# Patient Record
Sex: MALE | Race: WHITE | NOT HISPANIC OR LATINO
[De-identification: names, ages, dates, MRNs, and addresses within clinical notes are randomized per-mention and may not be internally consistent; named-entity substitution may affect disease eponyms.]

---

## 2018-03-12 ENCOUNTER — OTHER (OUTPATIENT)
Age: 53
End: 2018-03-12

## 2018-03-12 ENCOUNTER — APPOINTMENT (OUTPATIENT)
Dept: GASTROENTEROLOGY | Facility: CLINIC | Age: 53
End: 2018-03-12

## 2018-10-01 ENCOUNTER — APPOINTMENT (OUTPATIENT)
Dept: SURGERY | Facility: CLINIC | Age: 53
End: 2018-10-01
Payer: COMMERCIAL

## 2018-10-01 VITALS
SYSTOLIC BLOOD PRESSURE: 102 MMHG | BODY MASS INDEX: 22.66 KG/M2 | DIASTOLIC BLOOD PRESSURE: 68 MMHG | WEIGHT: 136 LBS | HEIGHT: 65 IN

## 2018-10-01 DIAGNOSIS — K83.8 OTHER SPECIFIED DISEASES OF BILIARY TRACT: ICD-10-CM

## 2018-10-01 DIAGNOSIS — N50.811 RIGHT TESTICULAR PAIN: ICD-10-CM

## 2018-10-01 PROCEDURE — 99242 OFF/OP CONSLTJ NEW/EST SF 20: CPT

## 2018-10-19 ENCOUNTER — APPOINTMENT (OUTPATIENT)
Dept: SURGERY | Facility: CLINIC | Age: 53
End: 2018-10-19

## 2019-04-03 ENCOUNTER — APPOINTMENT (OUTPATIENT)
Dept: PULMONOLOGY | Facility: CLINIC | Age: 54
End: 2019-04-03

## 2019-04-17 ENCOUNTER — APPOINTMENT (OUTPATIENT)
Dept: PULMONOLOGY | Facility: CLINIC | Age: 54
End: 2019-04-17

## 2019-07-03 ENCOUNTER — APPOINTMENT (OUTPATIENT)
Dept: PULMONOLOGY | Facility: CLINIC | Age: 54
End: 2019-07-03
Payer: COMMERCIAL

## 2019-07-03 VITALS
DIASTOLIC BLOOD PRESSURE: 70 MMHG | HEIGHT: 65 IN | BODY MASS INDEX: 22.66 KG/M2 | HEART RATE: 77 BPM | RESPIRATION RATE: 14 BRPM | SYSTOLIC BLOOD PRESSURE: 102 MMHG | TEMPERATURE: 98.4 F | OXYGEN SATURATION: 92 % | WEIGHT: 136 LBS

## 2019-07-03 DIAGNOSIS — Z82.5 FAMILY HISTORY OF ASTHMA AND OTHER CHRONIC LOWER RESPIRATORY DISEASES: ICD-10-CM

## 2019-07-03 LAB — POCT - HEMOGLOBIN (HGB), QUANTITATIVE, TRANSCUTANEOUS: 14.3

## 2019-07-03 PROCEDURE — 94729 DIFFUSING CAPACITY: CPT

## 2019-07-03 PROCEDURE — 99204 OFFICE O/P NEW MOD 45 MIN: CPT | Mod: 25

## 2019-07-03 PROCEDURE — 94060 EVALUATION OF WHEEZING: CPT

## 2019-07-03 PROCEDURE — 88738 HGB QUANT TRANSCUTANEOUS: CPT

## 2019-07-03 PROCEDURE — 95012 NITRIC OXIDE EXP GAS DETER: CPT

## 2019-07-03 PROCEDURE — 94727 GAS DIL/WSHOT DETER LNG VOL: CPT

## 2019-07-03 NOTE — PROCEDURE
[FreeTextEntry1] : CXR: clear lungs, no focal consolidation or pleural effusions, cardiac silhouette appears normal.  No bony abnormality.\par \par Exhaled nitric oxide: 62, elevated\par \par PFT: moderate obstructive dysfunction without a significant bronchodilator response.  Lung volumes low with evidence of air trapping. DLCO normal.\par

## 2019-07-03 NOTE — PHYSICAL EXAM
[Well Groomed] : well groomed [Neck Appearance] : the appearance of the neck was normal [General Appearance - In No Acute Distress] : no acute distress [] : no respiratory distress [Heart Sounds] : normal S1 and S2 [Exaggerated Use Of Accessory Muscles For Inspiration] : no accessory muscle use [Respiration, Rhythm And Depth] : normal respiratory rhythm and effort [Auscultation Breath Sounds / Voice Sounds] : lungs were clear to auscultation bilaterally [Nail Clubbing] : no clubbing of the fingernails [Cyanosis, Localized] : no localized cyanosis

## 2019-07-03 NOTE — ASSESSMENT
[FreeTextEntry1] : start asmanex bid now (samples given)\par start singulair now\par start medrol pack now\par reassess symptoms in 1 week\par \par

## 2019-07-03 NOTE — HISTORY OF PRESENT ILLNESS
[FreeTextEntry1] : 53 M gets a "cold" every three months.  Usually runny nose, chest congestion, cough, chest tightness and difficulty breathing, difficulty talking, gets very dizzy from the coughing.  Went to urgent care last week given zpack, advair, promethazine/codeine cough syrup.  Still no improvement in cough.   Hot air/cold air, strong smells also trigger difficulty breathing.  States he has been wheezing a lot last few days.  No known history of asthma.  Denies fever/chills. 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

## 2019-07-10 ENCOUNTER — APPOINTMENT (OUTPATIENT)
Dept: PULMONOLOGY | Facility: CLINIC | Age: 54
End: 2019-07-10

## 2019-07-24 ENCOUNTER — APPOINTMENT (OUTPATIENT)
Dept: DERMATOLOGY | Facility: CLINIC | Age: 54
End: 2019-07-24

## 2019-07-29 ENCOUNTER — APPOINTMENT (OUTPATIENT)
Dept: ENDOCRINOLOGY | Facility: CLINIC | Age: 54
End: 2019-07-29

## 2019-08-27 ENCOUNTER — APPOINTMENT (OUTPATIENT)
Dept: DERMATOLOGY | Facility: CLINIC | Age: 54
End: 2019-08-27

## 2019-09-18 ENCOUNTER — LABORATORY RESULT (OUTPATIENT)
Age: 54
End: 2019-09-18

## 2019-09-18 ENCOUNTER — APPOINTMENT (OUTPATIENT)
Dept: ENDOCRINOLOGY | Facility: CLINIC | Age: 54
End: 2019-09-18
Payer: COMMERCIAL

## 2019-09-18 VITALS
WEIGHT: 145 LBS | HEIGHT: 65 IN | DIASTOLIC BLOOD PRESSURE: 70 MMHG | HEART RATE: 68 BPM | OXYGEN SATURATION: 96 % | TEMPERATURE: 97.9 F | BODY MASS INDEX: 24.16 KG/M2 | SYSTOLIC BLOOD PRESSURE: 105 MMHG

## 2019-09-18 DIAGNOSIS — R79.89 OTHER SPECIFIED ABNORMAL FINDINGS OF BLOOD CHEMISTRY: ICD-10-CM

## 2019-09-18 PROCEDURE — 99204 OFFICE O/P NEW MOD 45 MIN: CPT | Mod: 25

## 2019-09-18 PROCEDURE — 36415 COLL VENOUS BLD VENIPUNCTURE: CPT

## 2019-09-18 RX ORDER — METHYLPREDNISOLONE 4 MG/1
4 TABLET ORAL
Qty: 1 | Refills: 2 | Status: DISCONTINUED | COMMUNITY
Start: 2019-07-03 | End: 2019-09-18

## 2019-09-19 ENCOUNTER — APPOINTMENT (OUTPATIENT)
Dept: PULMONOLOGY | Facility: CLINIC | Age: 54
End: 2019-09-19
Payer: COMMERCIAL

## 2019-09-19 VITALS
DIASTOLIC BLOOD PRESSURE: 71 MMHG | BODY MASS INDEX: 24.16 KG/M2 | HEART RATE: 74 BPM | SYSTOLIC BLOOD PRESSURE: 103 MMHG | RESPIRATION RATE: 15 BRPM | WEIGHT: 145 LBS | HEIGHT: 65 IN | OXYGEN SATURATION: 96 %

## 2019-09-19 DIAGNOSIS — R07.9 CHEST PAIN, UNSPECIFIED: ICD-10-CM

## 2019-09-19 PROCEDURE — 94060 EVALUATION OF WHEEZING: CPT

## 2019-09-19 PROCEDURE — 71046 X-RAY EXAM CHEST 2 VIEWS: CPT

## 2019-09-19 PROCEDURE — 99213 OFFICE O/P EST LOW 20 MIN: CPT | Mod: 25

## 2019-09-19 PROCEDURE — 36415 COLL VENOUS BLD VENIPUNCTURE: CPT

## 2019-09-19 NOTE — HISTORY OF PRESENT ILLNESS
[FreeTextEntry1] : since last visit did not take asmanex\par used singulair for short period, not taking anymore\par having sticking left upper chest pain, hurts with deep breathing, similair to initial presentation but not as bad.\par no fever/chills\par

## 2019-09-19 NOTE — ASSESSMENT
[FreeTextEntry1] : cont singulair for asthma, cont prn ventolin\par \par cp/dyspnea concerning, will send for CTA to r/o PE since breathing is improved from last visit but pain worsening.

## 2019-09-19 NOTE — PROCEDURE
[FreeTextEntry1] : CXR: clear lungs, no focal consolidation or pleural effusions, cardiac silhouette appears normal.  No bony abnormality.\par \par Spirometry: moderate obstructive dysfunction without a significant bronchodilator response.  Improved from last visit.\par

## 2019-09-20 LAB
ANION GAP SERPL CALC-SCNC: 14 MMOL/L
BUN SERPL-MCNC: 10 MG/DL
CALCIUM SERPL-MCNC: 9.9 MG/DL
CHLORIDE SERPL-SCNC: 102 MMOL/L
CO2 SERPL-SCNC: 25 MMOL/L
CREAT SERPL-MCNC: 1.08 MG/DL
DEPRECATED D DIMER PPP IA-ACNC: <150 NG/ML DDU
GLUCOSE SERPL-MCNC: 116 MG/DL
POTASSIUM SERPL-SCNC: 4.6 MMOL/L
SODIUM SERPL-SCNC: 141 MMOL/L

## 2019-09-29 PROBLEM — R79.89 ABNORMAL TSH: Status: ACTIVE | Noted: 2019-09-18

## 2019-09-29 LAB
25(OH)D3 SERPL-MCNC: 30.8 NG/ML
T3FREE SERPL-MCNC: 2.8 PG/ML
T4 FREE SERPL-MCNC: 1.2 NG/DL
THYROGLOB AB SERPL-ACNC: <20 IU/ML
THYROGLOB SERPL-MCNC: 22 NG/ML
TSH SERPL-ACNC: 2.71 UIU/ML

## 2019-10-03 ENCOUNTER — FORM ENCOUNTER (OUTPATIENT)
Age: 54
End: 2019-10-03

## 2019-10-03 ENCOUNTER — APPOINTMENT (OUTPATIENT)
Dept: PULMONOLOGY | Facility: CLINIC | Age: 54
End: 2019-10-03

## 2019-10-04 ENCOUNTER — APPOINTMENT (OUTPATIENT)
Dept: CT IMAGING | Facility: CLINIC | Age: 54
End: 2019-10-04
Payer: COMMERCIAL

## 2019-10-04 ENCOUNTER — OUTPATIENT (OUTPATIENT)
Dept: OUTPATIENT SERVICES | Facility: HOSPITAL | Age: 54
LOS: 1 days | End: 2019-10-04
Payer: COMMERCIAL

## 2019-10-04 DIAGNOSIS — R07.9 CHEST PAIN, UNSPECIFIED: ICD-10-CM

## 2019-10-04 PROCEDURE — 71275 CT ANGIOGRAPHY CHEST: CPT

## 2019-10-04 PROCEDURE — 71275 CT ANGIOGRAPHY CHEST: CPT | Mod: 26

## 2019-10-10 ENCOUNTER — APPOINTMENT (OUTPATIENT)
Dept: PULMONOLOGY | Facility: CLINIC | Age: 54
End: 2019-10-10
Payer: COMMERCIAL

## 2019-10-10 VITALS — HEART RATE: 80 BPM | DIASTOLIC BLOOD PRESSURE: 79 MMHG | OXYGEN SATURATION: 97 % | SYSTOLIC BLOOD PRESSURE: 131 MMHG

## 2019-10-10 PROCEDURE — 90686 IIV4 VACC NO PRSV 0.5 ML IM: CPT

## 2019-10-10 PROCEDURE — G0008: CPT

## 2019-10-10 PROCEDURE — 99213 OFFICE O/P EST LOW 20 MIN: CPT | Mod: 25

## 2019-10-10 NOTE — PHYSICAL EXAM
[Well Groomed] : well groomed [General Appearance - In No Acute Distress] : no acute distress [Neck Appearance] : the appearance of the neck was normal [Heart Sounds] : normal S1 and S2 [Exaggerated Use Of Accessory Muscles For Inspiration] : no accessory muscle use [] : no respiratory distress [Respiration, Rhythm And Depth] : normal respiratory rhythm and effort [Auscultation Breath Sounds / Voice Sounds] : lungs were clear to auscultation bilaterally [Nail Clubbing] : no clubbing of the fingernails [Cyanosis, Localized] : no localized cyanosis

## 2019-10-10 NOTE — PROCEDURE
[FreeTextEntry1] : EXAM: CT ANGIO CHEST (W)AW IC \par \par PROCEDURE DATE: 10/04/2019 \par \par INTERPRETATION: CLINICAL INFORMATION: 54-year-old man with shortness of \par breath for 6 months. \par \par COMPARISON: CT scan 06/10/2015 PROCEDURE: \par CT Angiography of the Chest. \par 90 ml of Omnipaque 350 was injected intravenously. 10 ml were discarded. \par Sagittal and coronal reformats were performed as well as 3D (MIP) \par reconstructions. \par \par FINDINGS: \par \par LUNGS AND AIRWAYS: Patent central airways. Calcified granuloma right lower \par lobe. \par \par PLEURA: No pleural effusion. \par \par MEDIASTINUM AND RAMON: No lymphadenopathy. \par \par VESSELS: No pulmonary embolus. \par \par HEART: Heart size is normal. No pericardial effusion. \par \par CHEST WALL AND LOWER NECK: Within normal limits. \par \par VISUALIZED UPPER ABDOMEN: Subcentimeter hypodensity liver dome. \par \par BONES: Within normal limits. \par \par IMPRESSION: \par \par No pulmonary embolus. \par \par Prior exams reviewed:\par CXR: clear lungs, no focal consolidation or pleural effusions, cardiac silhouette appears normal.  No bony abnormality.\par \par Spirometry: moderate obstructive dysfunction without a significant bronchodilator response.  Improved from last visit.\par

## 2019-10-10 NOTE — HISTORY OF PRESENT ILLNESS
[FreeTextEntry1] : still gets sticking left sided cp.  Had cta which was negative.\par reports having a cardiac stress test that was normal.\par when he gets sick he has a lot of coughing/difficulty breathing/wheeze.\par currently on singulair daily and self started a medrol pack bc got sick last week. \par breathing is ok today

## 2019-10-10 NOTE — ASSESSMENT
[FreeTextEntry1] : cont singulair daily, add breo daily (pts brother gave him a breo sample), cont prn ventolin\par \par strongly encouraged to stay on controller meds so that when he is sick he doesn't have a bad exacerbation.\par \par will rx medrol pack to have at home just in case.\par \par flu shot today\par \par fu 1 month

## 2019-10-16 ENCOUNTER — OTHER (OUTPATIENT)
Age: 54
End: 2019-10-16

## 2019-10-17 NOTE — PHYSICAL EXAM
[Alert] : alert [No Acute Distress] : no acute distress [Well Nourished] : well nourished [Normal Sclera/Conjunctiva] : normal sclera/conjunctiva [Well Developed] : well developed [EOMI] : extra ocular movement intact [No Proptosis] : no proptosis [Normal Oropharynx] : the oropharynx was normal [Thyroid Not Enlarged] : the thyroid was not enlarged [No Thyroid Nodules] : there were no palpable thyroid nodules [No Respiratory Distress] : no respiratory distress [No Accessory Muscle Use] : no accessory muscle use [Clear to Auscultation] : lungs were clear to auscultation bilaterally [Normal Rate] : heart rate was normal  [Normal S1, S2] : normal S1 and S2 [Regular Rhythm] : with a regular rhythm [No Edema] : there was no peripheral edema [Pedal Pulses Normal] : the pedal pulses are present [Normal Bowel Sounds] : normal bowel sounds [Not Tender] : non-tender [Soft] : abdomen soft [Not Distended] : not distended [Post Cervical Nodes] : posterior cervical nodes [Anterior Cervical Nodes] : anterior cervical nodes [Axillary Nodes] : axillary nodes [Normal] : normal and non tender [No Spinal Tenderness] : no spinal tenderness [No Stigmata of Cushings Syndrome] : no stigmata of cushings syndrome [Spine Straight] : spine straight [Normal Gait] : normal gait [Normal Strength/Tone] : muscle strength and tone were normal [No Rash] : no rash [Normal Reflexes] : deep tendon reflexes were 2+ and symmetric [No Tremors] : no tremors [Oriented x3] : oriented to person, place, and time [Acanthosis Nigricans] : no acanthosis nigricans

## 2019-10-17 NOTE — HISTORY OF PRESENT ILLNESS
[FreeTextEntry1] : Mr. RACHEL KIMBROUGH   is a 54 year  year old  male who presents today via the kind courtesy of PCP  Dr Fabricio Cole with regard to recent  history of thyroid nodule. Patient sister has thyroid disorder.  Patient had ultrasound  with a  1.2 cm right interpolar solid nodule noted with  percutaneous biopsy  recommended. too, tsh in January was 4.63 Patient denies difficulty swallowing or anterior neck pain. Patient is a social smoker. Reports takes PRN ? asthma medication/inhaler,possible albuterol.\par Additional medical history includes : Asthma Some foot swelling -is to see cardiologist\par Has follow up tomorrow with pulmonologist Dr Evette Ozuna \par No signif fatigue-he does stay awake nights and sleep during day.

## 2019-10-22 ENCOUNTER — APPOINTMENT (OUTPATIENT)
Dept: ENDOCRINOLOGY | Facility: CLINIC | Age: 54
End: 2019-10-22
Payer: COMMERCIAL

## 2019-10-22 VITALS
DIASTOLIC BLOOD PRESSURE: 80 MMHG | BODY MASS INDEX: 24.16 KG/M2 | TEMPERATURE: 97.6 F | SYSTOLIC BLOOD PRESSURE: 115 MMHG | WEIGHT: 145 LBS | HEART RATE: 75 BPM | HEIGHT: 65 IN | OXYGEN SATURATION: 97 %

## 2019-10-22 DIAGNOSIS — R05 COUGH: ICD-10-CM

## 2019-10-22 PROCEDURE — 99214 OFFICE O/P EST MOD 30 MIN: CPT

## 2019-11-07 ENCOUNTER — APPOINTMENT (OUTPATIENT)
Dept: SURGERY | Facility: CLINIC | Age: 54
End: 2019-11-07
Payer: COMMERCIAL

## 2019-11-07 VITALS
SYSTOLIC BLOOD PRESSURE: 127 MMHG | BODY MASS INDEX: 24.32 KG/M2 | WEIGHT: 146 LBS | HEART RATE: 64 BPM | DIASTOLIC BLOOD PRESSURE: 77 MMHG | HEIGHT: 65 IN

## 2019-11-07 PROCEDURE — 99204 OFFICE O/P NEW MOD 45 MIN: CPT | Mod: 25

## 2019-11-07 PROCEDURE — 31575 DIAGNOSTIC LARYNGOSCOPY: CPT

## 2019-11-17 PROBLEM — R05 COUGH: Status: ACTIVE | Noted: 2019-07-03

## 2019-11-17 NOTE — HISTORY OF PRESENT ILLNESS
[FreeTextEntry1] : Mr. Jim returns for endocrine follow up.  He did undergo US guided fna of a 1.1 x 1.0 cm rt interpolar nodule with lobulated/irregular margins.  The results of the fna,as discussed in detail today with Mr. Jim, were c/w Papillary thyroid carcinoma. He too has a hx of asthma and is on Singulari per pulmonologist.\par \par I did discuss the implications of this diagnosis in detail with Mr. Jim.\par Denies anterior neck discomfort, difficulty swallowing, or any change in the appearance of the neck region.\par \par

## 2019-11-17 NOTE — PHYSICAL EXAM
[Alert] : alert [Well Nourished] : well nourished [No Acute Distress] : no acute distress [Well Developed] : well developed [No Proptosis] : no proptosis [EOMI] : extra ocular movement intact [Normal Sclera/Conjunctiva] : normal sclera/conjunctiva [Normal Oropharynx] : the oropharynx was normal [Thyroid Not Enlarged] : the thyroid was not enlarged [No Respiratory Distress] : no respiratory distress [No Thyroid Nodules] : there were no palpable thyroid nodules [Normal Rate] : heart rate was normal  [No Accessory Muscle Use] : no accessory muscle use [Clear to Auscultation] : lungs were clear to auscultation bilaterally [Regular Rhythm] : with a regular rhythm [Normal S1, S2] : normal S1 and S2 [No Edema] : there was no peripheral edema [Pedal Pulses Normal] : the pedal pulses are present [Not Tender] : non-tender [Normal Bowel Sounds] : normal bowel sounds [Soft] : abdomen soft [Post Cervical Nodes] : posterior cervical nodes [Anterior Cervical Nodes] : anterior cervical nodes [Not Distended] : not distended [Axillary Nodes] : axillary nodes [Normal] : normal and non tender [Spine Straight] : spine straight [No Spinal Tenderness] : no spinal tenderness [No Stigmata of Cushings Syndrome] : no stigmata of cushings syndrome [Normal Gait] : normal gait [Normal Strength/Tone] : muscle strength and tone were normal [No Rash] : no rash [Acanthosis Nigricans] : no acanthosis nigricans [Oriented x3] : oriented to person, place, and time [Normal Reflexes] : deep tendon reflexes were 2+ and symmetric [No Tremors] : no tremors

## 2020-01-08 ENCOUNTER — RESULT REVIEW (OUTPATIENT)
Age: 55
End: 2020-01-08

## 2020-01-09 NOTE — REASON FOR VISIT
[Initial Consultation] : an initial consultation for [FreeTextEntry2] : Papillary Thyroid malignancy

## 2020-01-09 NOTE — PHYSICAL EXAM
[Nasal Endoscopy Performed] : nasal endoscopy was performed, see procedure section for findings [Laryngoscopy Performed] : laryngoscopy was performed, see procedure section for findings [Midline] : located in midline position [Normal] : cranial nerves 2-12 intact [de-identified] : < 1 cm right thyroid firm nodule, well circumscribed and mobile [de-identified] : fiberoptic laryngoscopy shows normal vocal cord mobility bilaterally with no lesions noted, arytenoid erythema

## 2020-01-09 NOTE — ASSESSMENT
[FreeTextEntry1] : lengthy discussion regarding options for management including active surveillance within a formal study vs thyroid lobectomy with paratracheal node dissection, with or without frozen section vs total thyroidectomy with paratracheal node dissection.  risks, benefits and alternatives discussed at length.  I have discussed with the patient the anatomy of the area, the pathophysiology of the disease process and the rationale for surgery.  The attendant risks, possible complications and expected postoperative course have been discussed in detail.  I have given the patient the opportunity to ask questions, and all questions have been answered to the patient's satisfaction, and he wishes to proceed with lobectomy, possible total thyroidectomy.  instructed to avoid irritants and consult with GI for suspected GERD.

## 2020-01-09 NOTE — CONSULT LETTER
[Dear  ___] : Dear  [unfilled], [Consult Letter:] : I had the pleasure of evaluating your patient, [unfilled]. [Please see my note below.] : Please see my note below. [Consult Closing:] : Thank you very much for allowing me to participate in the care of this patient.  If you have any questions, please do not hesitate to contact me. [Sincerely,] : Sincerely, [DrScot  ___] : Dr. PECK [FreeTextEntry3] : Louis Cyr MD, FACS\par System Director, Endocrine Surgery\par [facility]\par  [FreeTextEntry2] : Dr. Jersey Baron, Dr. Fabricio Cole

## 2020-01-09 NOTE — HISTORY OF PRESENT ILLNESS
[de-identified] : Pt c/o Thyroid nodule.   denies dysphagia, hoarseness, SOB or RT exposure\par TFTs normal\par Sonogram:   Right 1.2 cm  nodule. \par FNA  ([facility])positive for papillary Thyroid malignancy

## 2020-01-13 ENCOUNTER — OUTPATIENT (OUTPATIENT)
Dept: OUTPATIENT SERVICES | Facility: HOSPITAL | Age: 55
LOS: 1 days | End: 2020-01-13
Payer: COMMERCIAL

## 2020-01-13 VITALS
HEIGHT: 64 IN | WEIGHT: 143.08 LBS | OXYGEN SATURATION: 98 % | SYSTOLIC BLOOD PRESSURE: 120 MMHG | TEMPERATURE: 98 F | RESPIRATION RATE: 18 BRPM | HEART RATE: 71 BPM | DIASTOLIC BLOOD PRESSURE: 68 MMHG

## 2020-01-13 DIAGNOSIS — C73 MALIGNANT NEOPLASM OF THYROID GLAND: ICD-10-CM

## 2020-01-13 DIAGNOSIS — Z01.818 ENCOUNTER FOR OTHER PREPROCEDURAL EXAMINATION: ICD-10-CM

## 2020-01-13 DIAGNOSIS — Z90.89 ACQUIRED ABSENCE OF OTHER ORGANS: Chronic | ICD-10-CM

## 2020-01-13 DIAGNOSIS — Z98.890 OTHER SPECIFIED POSTPROCEDURAL STATES: Chronic | ICD-10-CM

## 2020-01-13 LAB
ANION GAP SERPL CALC-SCNC: 14 MMO/L — SIGNIFICANT CHANGE UP (ref 7–14)
BUN SERPL-MCNC: 14 MG/DL — SIGNIFICANT CHANGE UP (ref 7–23)
CALCIUM SERPL-MCNC: 10.1 MG/DL — SIGNIFICANT CHANGE UP (ref 8.4–10.5)
CHLORIDE SERPL-SCNC: 99 MMOL/L — SIGNIFICANT CHANGE UP (ref 98–107)
CO2 SERPL-SCNC: 27 MMOL/L — SIGNIFICANT CHANGE UP (ref 22–31)
CREAT SERPL-MCNC: 1.09 MG/DL — SIGNIFICANT CHANGE UP (ref 0.5–1.3)
GLUCOSE SERPL-MCNC: 81 MG/DL — SIGNIFICANT CHANGE UP (ref 70–99)
HBA1C BLD-MCNC: 5.8 % — HIGH (ref 4–5.6)
HCT VFR BLD CALC: 47.2 % — SIGNIFICANT CHANGE UP (ref 39–50)
HGB BLD-MCNC: 15.1 G/DL — SIGNIFICANT CHANGE UP (ref 13–17)
MCHC RBC-ENTMCNC: 30 PG — SIGNIFICANT CHANGE UP (ref 27–34)
MCHC RBC-ENTMCNC: 32 % — SIGNIFICANT CHANGE UP (ref 32–36)
MCV RBC AUTO: 93.8 FL — SIGNIFICANT CHANGE UP (ref 80–100)
NRBC # FLD: 0 K/UL — SIGNIFICANT CHANGE UP (ref 0–0)
PLATELET # BLD AUTO: 208 K/UL — SIGNIFICANT CHANGE UP (ref 150–400)
PMV BLD: 12.8 FL — SIGNIFICANT CHANGE UP (ref 7–13)
POTASSIUM SERPL-MCNC: 4.4 MMOL/L — SIGNIFICANT CHANGE UP (ref 3.5–5.3)
POTASSIUM SERPL-SCNC: 4.4 MMOL/L — SIGNIFICANT CHANGE UP (ref 3.5–5.3)
RBC # BLD: 5.03 M/UL — SIGNIFICANT CHANGE UP (ref 4.2–5.8)
RBC # FLD: 12.6 % — SIGNIFICANT CHANGE UP (ref 10.3–14.5)
SODIUM SERPL-SCNC: 140 MMOL/L — SIGNIFICANT CHANGE UP (ref 135–145)
WBC # BLD: 7.79 K/UL — SIGNIFICANT CHANGE UP (ref 3.8–10.5)
WBC # FLD AUTO: 7.79 K/UL — SIGNIFICANT CHANGE UP (ref 3.8–10.5)

## 2020-01-13 PROCEDURE — 93010 ELECTROCARDIOGRAM REPORT: CPT

## 2020-01-13 NOTE — H&P PST ADULT - MS EXT PE MLT D E PC
normal/no cyanosis/no clubbing/Left pinky deformity, left antecubital fossa skin graft scar well healed/no pedal edema

## 2020-01-13 NOTE — H&P PST ADULT - NSICDXPASTMEDICALHX_GEN_ALL_CORE_FT
PAST MEDICAL HISTORY:  Cervical herniated disc PAST MEDICAL HISTORY:  Asthma     Cervical herniated disc     Left inguinal hernia     Malignant neoplasm of thyroid gland

## 2020-01-13 NOTE — H&P PST ADULT - RS GEN PE MLT RESP DETAILS PC
airway patent/respirations non-labored/clear to auscultation bilaterally/good air movement/no intercostal retractions/breath sounds equal/no rales/no subcutaneous emphysema/no wheezes/no chest wall tenderness/no rhonchi

## 2020-01-13 NOTE — H&P PST ADULT - NEGATIVE CARDIOVASCULAR SYMPTOMS
no chest pain/no claudication/no palpitations/no dyspnea on exertion/no orthopnea/no paroxysmal nocturnal dyspnea

## 2020-01-13 NOTE — H&P PST ADULT - NEGATIVE GENERAL SYMPTOMS
no sweating/no fatigue/no weight gain/no polyphagia/no polydipsia/no chills/no fever/no malaise no chills/no weight loss/no weight gain/no polyphagia/no polydipsia/no fatigue/no fever/no sweating/no malaise

## 2020-01-13 NOTE — H&P PST ADULT - HISTORY OF PRESENT ILLNESS
55 y/o male with PMH: Asthma  abd. blood test 2 months ago, S/P US of thyroid / Biopsy of right thyroid. 55 y/o male with PMH: Asthma presents to PST for pre op evaluation stated that "abd. blood test 2 months ago". S/P US of thyroid / Biopsy of right thyroid. Now scheduled for right thyroidectomy, paratracheal node dissection , possible total thyroidectomy on 01/27/2020

## 2020-01-13 NOTE — H&P PST ADULT - MUSCULOSKELETAL
details… No joint pain, swelling or deformity; no limitation of movement detailed exam left pinky stffness/no joint swelling/no joint erythema/no calf tenderness no joint swelling/no joint erythema/left pinky stiffness/no calf tenderness

## 2020-01-13 NOTE — H&P PST ADULT - NSICDXPROBLEM_GEN_ALL_CORE_FT
PROBLEM DIAGNOSES  Problem: Malignant neoplasm of thyroid gland  Assessment and Plan: Scheduled for right thyroid lobectomy, paratracheal node dissection, possible total thyroidectomy on 01/27/2020. Pre op instructions, famotidine, chlorhexidine gluconate soap given and explained. Pt verbalized understanding.

## 2020-01-13 NOTE — H&P PST ADULT - NEGATIVE ENMT SYMPTOMS
no vertigo/no sinus symptoms/no gum bleeding/no tinnitus/no nose bleeds/no throat pain/no dysphagia/no abnormal taste sensation/no dry mouth no tinnitus/no vertigo/no sinus symptoms/no gum bleeding/no dysphagia/no ear pain/no throat pain/no nose bleeds/no abnormal taste sensation/no dry mouth no tinnitus/no vertigo/no sinus symptoms/no nasal congestion/no gum bleeding/no ear pain/no abnormal taste sensation/no dry mouth/no throat pain/no dysphagia/no nose bleeds

## 2020-01-13 NOTE — H&P PST ADULT - NEGATIVE BREAST SYMPTOMS
no breast tenderness R/no nipple discharge L/no nipple discharge R/no breast tenderness L/no breast lump R/no breast lump L

## 2020-01-13 NOTE — H&P PST ADULT - NEGATIVE GENERAL GENITOURINARY SYMPTOMS
no hematuria/no gas in urine/no flank pain R/no flank pain L/normal urinary frequency/no urine discoloration/no renal colic

## 2020-01-13 NOTE — H&P PST ADULT - NEGATIVE GASTROINTESTINAL SYMPTOMS
no vomiting/no diarrhea/no constipation/no abdominal pain/no melena/no jaundice/no hiccoughs/no nausea/no change in bowel habits/no steatorrhea

## 2020-01-15 ENCOUNTER — FORM ENCOUNTER (OUTPATIENT)
Age: 55
End: 2020-01-15

## 2020-01-16 ENCOUNTER — APPOINTMENT (OUTPATIENT)
Dept: ULTRASOUND IMAGING | Facility: CLINIC | Age: 55
End: 2020-01-16
Payer: COMMERCIAL

## 2020-01-16 ENCOUNTER — OUTPATIENT (OUTPATIENT)
Dept: OUTPATIENT SERVICES | Facility: HOSPITAL | Age: 55
LOS: 1 days | End: 2020-01-16
Payer: COMMERCIAL

## 2020-01-16 DIAGNOSIS — C73 MALIGNANT NEOPLASM OF THYROID GLAND: ICD-10-CM

## 2020-01-16 DIAGNOSIS — Z90.89 ACQUIRED ABSENCE OF OTHER ORGANS: Chronic | ICD-10-CM

## 2020-01-16 DIAGNOSIS — Z98.890 OTHER SPECIFIED POSTPROCEDURAL STATES: Chronic | ICD-10-CM

## 2020-01-16 PROBLEM — K40.90 UNILATERAL INGUINAL HERNIA, WITHOUT OBSTRUCTION OR GANGRENE, NOT SPECIFIED AS RECURRENT: Chronic | Status: ACTIVE | Noted: 2020-01-13

## 2020-01-16 PROBLEM — M50.20 OTHER CERVICAL DISC DISPLACEMENT, UNSPECIFIED CERVICAL REGION: Chronic | Status: ACTIVE | Noted: 2020-01-13

## 2020-01-16 PROBLEM — J45.909 UNSPECIFIED ASTHMA, UNCOMPLICATED: Chronic | Status: ACTIVE | Noted: 2020-01-13

## 2020-01-16 PROCEDURE — 76536 US EXAM OF HEAD AND NECK: CPT

## 2020-01-16 PROCEDURE — 76536 US EXAM OF HEAD AND NECK: CPT | Mod: 26

## 2020-01-21 ENCOUNTER — APPOINTMENT (OUTPATIENT)
Dept: PULMONOLOGY | Facility: CLINIC | Age: 55
End: 2020-01-21

## 2020-01-22 ENCOUNTER — APPOINTMENT (OUTPATIENT)
Dept: PULMONOLOGY | Facility: CLINIC | Age: 55
End: 2020-01-22
Payer: COMMERCIAL

## 2020-01-22 VITALS — DIASTOLIC BLOOD PRESSURE: 73 MMHG | SYSTOLIC BLOOD PRESSURE: 111 MMHG | HEART RATE: 70 BPM | OXYGEN SATURATION: 97 %

## 2020-01-22 LAB — POCT - HEMOGLOBIN (HGB), QUANTITATIVE, TRANSCUTANEOUS: 14.5

## 2020-01-22 PROCEDURE — 94727 GAS DIL/WSHOT DETER LNG VOL: CPT

## 2020-01-22 PROCEDURE — 88738 HGB QUANT TRANSCUTANEOUS: CPT

## 2020-01-22 PROCEDURE — 99214 OFFICE O/P EST MOD 30 MIN: CPT | Mod: 25

## 2020-01-22 PROCEDURE — 94060 EVALUATION OF WHEEZING: CPT

## 2020-01-22 PROCEDURE — 94729 DIFFUSING CAPACITY: CPT

## 2020-01-22 RX ORDER — ALBUTEROL SULFATE 90 UG/1
108 (90 BASE) AEROSOL, METERED RESPIRATORY (INHALATION) EVERY 4 HOURS
Qty: 1 | Refills: 2 | Status: ACTIVE | COMMUNITY
Start: 2020-01-22 | End: 1900-01-01

## 2020-01-22 NOTE — ASSESSMENT
[FreeTextEntry1] : breo daily, singulair daily\par prn pro air\par medrol pack to have just in case\par check RVP

## 2020-01-22 NOTE — PROCEDURE
[FreeTextEntry1] : PFT: Normal spirometry without a significant bronchodilator response.  Lung volumes normal. DLCO normal.  Best it has been.\par

## 2020-01-23 LAB — RAPID RVP RESULT: NOT DETECTED

## 2020-01-27 ENCOUNTER — OTHER (OUTPATIENT)
Age: 55
End: 2020-01-27

## 2020-01-28 ENCOUNTER — APPOINTMENT (OUTPATIENT)
Dept: SURGERY | Facility: CLINIC | Age: 55
End: 2020-01-28

## 2020-02-11 ENCOUNTER — APPOINTMENT (OUTPATIENT)
Dept: PULMONOLOGY | Facility: CLINIC | Age: 55
End: 2020-02-11

## 2020-03-30 ENCOUNTER — APPOINTMENT (OUTPATIENT)
Dept: SURGERY | Facility: HOSPITAL | Age: 55
End: 2020-03-30

## 2020-04-27 RX ORDER — MONTELUKAST 10 MG/1
10 TABLET, FILM COATED ORAL DAILY
Qty: 90 | Refills: 0 | Status: ACTIVE | COMMUNITY
Start: 2019-07-03 | End: 1900-01-01

## 2020-04-30 ENCOUNTER — APPOINTMENT (OUTPATIENT)
Dept: SURGERY | Facility: CLINIC | Age: 55
End: 2020-04-30

## 2020-06-11 ENCOUNTER — APPOINTMENT (OUTPATIENT)
Dept: SURGERY | Facility: CLINIC | Age: 55
End: 2020-06-11

## 2020-06-17 ENCOUNTER — APPOINTMENT (OUTPATIENT)
Dept: SURGERY | Facility: CLINIC | Age: 55
End: 2020-06-17
Payer: COMMERCIAL

## 2020-06-17 PROCEDURE — 99214 OFFICE O/P EST MOD 30 MIN: CPT

## 2020-06-17 NOTE — PHYSICAL EXAM
[de-identified] : < 1 cm right thyroid firm nodule, well circumscribed and mobile [Laryngoscopy Performed] : laryngoscopy was performed, see procedure section for findings [Midline] : located in midline position [Normal] : no neck adenopathy

## 2020-06-17 NOTE — HISTORY OF PRESENT ILLNESS
[de-identified] : prior evaluation of thyroid cancer.  denies dysphagia, hoarseness or new lesions.  had been scheduled for surgery which was cancelled first time due to infection, second time due to Covid-19.  recent sonogram shows minimal enlargement of cancer, benign appearing nodes. on Singulair which he takes intermittently

## 2020-06-26 ENCOUNTER — APPOINTMENT (OUTPATIENT)
Dept: DISASTER EMERGENCY | Facility: CLINIC | Age: 55
End: 2020-06-26

## 2020-06-26 DIAGNOSIS — Z01.818 ENCOUNTER FOR OTHER PREPROCEDURAL EXAMINATION: ICD-10-CM

## 2020-06-29 ENCOUNTER — APPOINTMENT (OUTPATIENT)
Dept: SURGERY | Facility: HOSPITAL | Age: 55
End: 2020-06-29

## 2020-07-03 ENCOUNTER — TRANSCRIPTION ENCOUNTER (OUTPATIENT)
Age: 55
End: 2020-07-03

## 2020-07-08 ENCOUNTER — OUTPATIENT (OUTPATIENT)
Dept: OUTPATIENT SERVICES | Facility: HOSPITAL | Age: 55
LOS: 1 days | End: 2020-07-08

## 2020-07-08 VITALS
WEIGHT: 147.05 LBS | HEIGHT: 65 IN | DIASTOLIC BLOOD PRESSURE: 78 MMHG | HEART RATE: 67 BPM | RESPIRATION RATE: 18 BRPM | OXYGEN SATURATION: 97 % | SYSTOLIC BLOOD PRESSURE: 118 MMHG | TEMPERATURE: 99 F

## 2020-07-08 DIAGNOSIS — C73 MALIGNANT NEOPLASM OF THYROID GLAND: ICD-10-CM

## 2020-07-08 DIAGNOSIS — Z98.890 OTHER SPECIFIED POSTPROCEDURAL STATES: Chronic | ICD-10-CM

## 2020-07-08 DIAGNOSIS — Z90.89 ACQUIRED ABSENCE OF OTHER ORGANS: Chronic | ICD-10-CM

## 2020-07-08 LAB
ANION GAP SERPL CALC-SCNC: 12 MMO/L — SIGNIFICANT CHANGE UP (ref 7–14)
BUN SERPL-MCNC: 9 MG/DL — SIGNIFICANT CHANGE UP (ref 7–23)
CALCIUM SERPL-MCNC: 10.2 MG/DL — SIGNIFICANT CHANGE UP (ref 8.4–10.5)
CHLORIDE SERPL-SCNC: 103 MMOL/L — SIGNIFICANT CHANGE UP (ref 98–107)
CO2 SERPL-SCNC: 26 MMOL/L — SIGNIFICANT CHANGE UP (ref 22–31)
CREAT SERPL-MCNC: 1.08 MG/DL — SIGNIFICANT CHANGE UP (ref 0.5–1.3)
GLUCOSE SERPL-MCNC: 91 MG/DL — SIGNIFICANT CHANGE UP (ref 70–99)
HBA1C BLD-MCNC: 5.8 % — HIGH (ref 4–5.6)
HCT VFR BLD CALC: 43.4 % — SIGNIFICANT CHANGE UP (ref 39–50)
HGB BLD-MCNC: 14.1 G/DL — SIGNIFICANT CHANGE UP (ref 13–17)
MCHC RBC-ENTMCNC: 29.4 PG — SIGNIFICANT CHANGE UP (ref 27–34)
MCHC RBC-ENTMCNC: 32.5 % — SIGNIFICANT CHANGE UP (ref 32–36)
MCV RBC AUTO: 90.4 FL — SIGNIFICANT CHANGE UP (ref 80–100)
NRBC # FLD: 0 K/UL — SIGNIFICANT CHANGE UP (ref 0–0)
PLATELET # BLD AUTO: 238 K/UL — SIGNIFICANT CHANGE UP (ref 150–400)
PMV BLD: 12.5 FL — SIGNIFICANT CHANGE UP (ref 7–13)
POTASSIUM SERPL-MCNC: 4.7 MMOL/L — SIGNIFICANT CHANGE UP (ref 3.5–5.3)
POTASSIUM SERPL-SCNC: 4.7 MMOL/L — SIGNIFICANT CHANGE UP (ref 3.5–5.3)
RBC # BLD: 4.8 M/UL — SIGNIFICANT CHANGE UP (ref 4.2–5.8)
RBC # FLD: 12.9 % — SIGNIFICANT CHANGE UP (ref 10.3–14.5)
SODIUM SERPL-SCNC: 141 MMOL/L — SIGNIFICANT CHANGE UP (ref 135–145)
WBC # BLD: 7.82 K/UL — SIGNIFICANT CHANGE UP (ref 3.8–10.5)
WBC # FLD AUTO: 7.82 K/UL — SIGNIFICANT CHANGE UP (ref 3.8–10.5)

## 2020-07-08 RX ORDER — FLUTICASONE PROPIONATE AND SALMETEROL 50; 250 UG/1; UG/1
0 POWDER ORAL; RESPIRATORY (INHALATION)
Qty: 0 | Refills: 0 | DISCHARGE

## 2020-07-08 RX ORDER — CIPROFLOXACIN LACTATE 400MG/40ML
1 VIAL (ML) INTRAVENOUS
Qty: 0 | Refills: 0 | DISCHARGE

## 2020-07-08 NOTE — H&P PST ADULT - NSICDXPASTMEDICALHX_GEN_ALL_CORE_FT
PAST MEDICAL HISTORY:  Asthma     Cervical herniated disc     Left inguinal hernia     Malignant neoplasm of thyroid gland PAST MEDICAL HISTORY:  Asthma     Cervical herniated disc     Left inguinal hernia     Malignant neoplasm of thyroid gland     Prediabetes "borderline", per pt

## 2020-07-08 NOTE — H&P PST ADULT - NSICDXPROBLEM_GEN_ALL_CORE_FT
PROBLEM DIAGNOSES  Problem: Malignant neoplasm of thyroid gland  Assessment and Plan: Right thyroidectomy, paratracheal node dissection , possible total thyroidectomy.   CBC BMP Hgba1C   EKG done by Cardiologist, requested on chart  Pt reports he was seen by Dr. Lucas for pre-op eval.    Preop instructions and antibacterial soap given and explained (verbal and written), with teach back.   Pt reminded to hav covid test within 72 hours of surgery

## 2020-07-08 NOTE — H&P PST ADULT - ASSESSMENT
53 y/o male with hx of thyroid nodule  S/P US of thyroid / Biopsy of right thyroid. Now scheduled for right thyroidectomy, paratracheal node dissection, possible total thyroidectomy.

## 2020-07-08 NOTE — H&P PST ADULT - HISTORY OF PRESENT ILLNESS
53 y/o male with hx of thyroid nodule  S/P US of thyroid / Biopsy of right thyroid. Now scheduled for right thyroidectomy, paratracheal node dissection , possible total thyroidectomy.

## 2020-07-08 NOTE — H&P PST ADULT - LAST CARDIAC ANGIOGRAM/IMAGING
CT angiogram ([facility] in [place]) [phone]  normal per pt CT angiogram (done at [facility] in [place]) [phone]  normal per pt

## 2020-07-13 ENCOUNTER — APPOINTMENT (OUTPATIENT)
Dept: DISASTER EMERGENCY | Facility: CLINIC | Age: 55
End: 2020-07-13

## 2020-07-13 DIAGNOSIS — Z01.818 ENCOUNTER FOR OTHER PREPROCEDURAL EXAMINATION: ICD-10-CM

## 2020-07-14 ENCOUNTER — TRANSCRIPTION ENCOUNTER (OUTPATIENT)
Age: 55
End: 2020-07-14

## 2020-07-14 LAB — SARS-COV-2 N GENE NPH QL NAA+PROBE: NOT DETECTED

## 2020-07-14 NOTE — ASU PATIENT PROFILE, ADULT - PMH
Asthma    Cervical herniated disc    Left inguinal hernia    Malignant neoplasm of thyroid gland    Prediabetes  "borderline", per pt

## 2020-07-15 ENCOUNTER — APPOINTMENT (OUTPATIENT)
Dept: SURGERY | Facility: HOSPITAL | Age: 55
End: 2020-07-15

## 2020-07-15 ENCOUNTER — RESULT REVIEW (OUTPATIENT)
Age: 55
End: 2020-07-15

## 2020-07-15 ENCOUNTER — OUTPATIENT (OUTPATIENT)
Dept: OUTPATIENT SERVICES | Facility: HOSPITAL | Age: 55
LOS: 1 days | Discharge: HOME | End: 2020-07-15
Payer: COMMERCIAL

## 2020-07-15 VITALS
HEART RATE: 72 BPM | HEIGHT: 65 IN | SYSTOLIC BLOOD PRESSURE: 150 MMHG | OXYGEN SATURATION: 99 % | TEMPERATURE: 98 F | WEIGHT: 147.05 LBS | DIASTOLIC BLOOD PRESSURE: 87 MMHG | RESPIRATION RATE: 16 BRPM

## 2020-07-15 VITALS
OXYGEN SATURATION: 95 % | RESPIRATION RATE: 16 BRPM | DIASTOLIC BLOOD PRESSURE: 79 MMHG | SYSTOLIC BLOOD PRESSURE: 139 MMHG | HEART RATE: 84 BPM

## 2020-07-15 DIAGNOSIS — Z98.890 OTHER SPECIFIED POSTPROCEDURAL STATES: Chronic | ICD-10-CM

## 2020-07-15 DIAGNOSIS — Z90.89 ACQUIRED ABSENCE OF OTHER ORGANS: Chronic | ICD-10-CM

## 2020-07-15 DIAGNOSIS — C73 MALIGNANT NEOPLASM OF THYROID GLAND: ICD-10-CM

## 2020-07-15 PROCEDURE — 13132 CMPLX RPR F/C/C/M/N/AX/G/H/F: CPT | Mod: 59

## 2020-07-15 PROCEDURE — 60252 REMOVAL OF THYROID: CPT

## 2020-07-15 PROCEDURE — 88307 TISSUE EXAM BY PATHOLOGIST: CPT | Mod: 26

## 2020-07-15 RX ORDER — BENZOCAINE AND MENTHOL 5; 1 G/100ML; G/100ML
1 LIQUID ORAL
Refills: 0 | Status: DISCONTINUED | OUTPATIENT
Start: 2020-07-15 | End: 2020-07-30

## 2020-07-15 RX ORDER — OXYCODONE HYDROCHLORIDE 5 MG/1
5 TABLET ORAL ONCE
Refills: 0 | Status: DISCONTINUED | OUTPATIENT
Start: 2020-07-15 | End: 2020-07-15

## 2020-07-15 RX ORDER — SODIUM CHLORIDE 9 MG/ML
1000 INJECTION, SOLUTION INTRAVENOUS
Refills: 0 | Status: DISCONTINUED | OUTPATIENT
Start: 2020-07-15 | End: 2020-07-30

## 2020-07-15 RX ORDER — ACETAMINOPHEN 500 MG
2 TABLET ORAL
Qty: 0 | Refills: 0 | DISCHARGE
Start: 2020-07-15

## 2020-07-15 RX ORDER — OXYCODONE AND ACETAMINOPHEN 5; 325 MG/1; MG/1
1 TABLET ORAL EVERY 6 HOURS
Refills: 0 | Status: DISCONTINUED | OUTPATIENT
Start: 2020-07-15 | End: 2020-07-15

## 2020-07-15 RX ORDER — ACETAMINOPHEN 500 MG
650 TABLET ORAL EVERY 6 HOURS
Refills: 0 | Status: DISCONTINUED | OUTPATIENT
Start: 2020-07-15 | End: 2020-07-30

## 2020-07-15 RX ADMIN — OXYCODONE HYDROCHLORIDE 5 MILLIGRAM(S): 5 TABLET ORAL at 19:00

## 2020-07-15 RX ADMIN — OXYCODONE HYDROCHLORIDE 5 MILLIGRAM(S): 5 TABLET ORAL at 18:30

## 2020-07-15 NOTE — ASU PREOP CHECKLIST - 1.
Report from Fe JOHN/RN at 1157 Report from Fe JOHN/CARLINE at 1150 /Report taken from Megan CASTRO RN

## 2020-07-15 NOTE — BRIEF OPERATIVE NOTE - COMMENTS
Patient tolerated procedure well ,was extubated without complication and transferred to the PACU in hemodynamically stable condition, EMERITA drain left sub fascial

## 2020-07-15 NOTE — ASU DISCHARGE PLAN (ADULT/PEDIATRIC) - CALL YOUR DOCTOR IF YOU HAVE ANY OF THE FOLLOWING:
Nausea and vomiting that does not stop/Unable to urinate/Wound/Surgical Site with redness, or foul smelling discharge or pus/Increased irritability or sluggishness/Swelling that gets worse/Inability to tolerate liquids or foods/Bleeding that does not stop/Fever greater than (need to indicate Fahrenheit or Celsius)/Pain not relieved by Medications

## 2020-07-15 NOTE — CHART NOTE - NSCHARTNOTEFT_GEN_A_CORE
Pt is s/p right hemithyroidectomy, seen and examined at bedside, with stable vitals. Cooperative, pleasant, and able to talk. Denies labored breathing, wheezing, or inspiratory dyspnea.   No active bleeding or incisional s/c hematoma are seen during current examination. EMERITA lightly SS with out put of 25cc.      Vital Signs Last 24 Hrs  T(C): 36.6 (15 Jul 2020 19:45), Max: 37 (15 Jul 2020 16:35)  T(F): 97.9 (15 Jul 2020 19:45), Max: 98.6 (15 Jul 2020 16:35)  HR: 76 (15 Jul 2020 20:00) (72 - 97)  BP: 147/80 (15 Jul 2020 20:00) (128/76 - 150/87)  BP(mean): 91 (15 Jul 2020 19:30) (88 - 95)  RR: 18 (15 Jul 2020 20:00) (15 - 22)  SpO2: 95% (15 Jul 2020 20:00) (95% - 99%)    I&O's Detail    15 Jul 2020 07:01  -  15 Jul 2020 20:18  --------------------------------------------------------  IN:    lactated ringers.: 200 mL    Oral Fluid: 680 mL  Total IN: 880 mL    OUT:    Drain: 15 mL  Total OUT: 15 mL    Total NET: 865 mL          Plan:   1- s/p right hemithyroidectomy, pt is stable, able to speak (voice heard), without respiratory difficulties.   2-PO meds to control pain  3- DC Home.

## 2020-07-15 NOTE — ASU PREOP CHECKLIST - COMMENTS
omeprazol,,tums, & montelukast at 0930 with sip of water omeprazole, Breo Tums, & montelukast at 0930 with sip of water

## 2020-07-15 NOTE — ASU DISCHARGE PLAN (ADULT/PEDIATRIC) - CARE PROVIDER_API CALL
Louis Cyr  PLASTIC SURGERY  [address] [place] [address]  Phone: [phone]  Fax: [phone]  Follow Up Time:

## 2020-07-15 NOTE — ASU DISCHARGE PLAN (ADULT/PEDIATRIC) - NURSING INSTRUCTIONS
DO NOT take any Tylenol (Acetaminophen) or narcotics containing Tylenol until after  10:10pm . You received Tylenol during your operation and it can cause damage to your liver if too much is taken within a 24 hour time period.

## 2020-07-16 ENCOUNTER — APPOINTMENT (OUTPATIENT)
Dept: SURGERY | Facility: CLINIC | Age: 55
End: 2020-07-16
Payer: COMMERCIAL

## 2020-07-16 PROCEDURE — 99024 POSTOP FOLLOW-UP VISIT: CPT

## 2020-07-16 NOTE — ASSESSMENT
[FreeTextEntry1] : s/p thyroid lobectomy\par daily care\par drain removed\par call for path\par f/u 1 month

## 2020-07-16 NOTE — PHYSICAL EXAM
[de-identified] : minimal postop swelling [Midline] : located in midline position [Normal] : orientation to person, place, and time: normal

## 2020-07-17 LAB — SURGICAL PATHOLOGY STUDY: SIGNIFICANT CHANGE UP

## 2020-07-27 PROBLEM — R73.03 PREDIABETES: Chronic | Status: ACTIVE | Noted: 2020-07-08

## 2020-07-28 ENCOUNTER — APPOINTMENT (OUTPATIENT)
Dept: SURGERY | Facility: CLINIC | Age: 55
End: 2020-07-28
Payer: COMMERCIAL

## 2020-07-28 PROCEDURE — 99024 POSTOP FOLLOW-UP VISIT: CPT

## 2020-07-28 PROCEDURE — 36415 COLL VENOUS BLD VENIPUNCTURE: CPT

## 2020-07-28 NOTE — PHYSICAL EXAM
[de-identified] : normal postop swelling [Midline] : located in midline position [Normal] : orientation to person, place, and time: normal

## 2020-07-28 NOTE — HISTORY OF PRESENT ILLNESS
[de-identified] : Pt 2 weeks s/p thyroid lobectomy c/o swelling and occasional discomfort and fatigue

## 2020-07-29 LAB
T3 SERPL-MCNC: 90 NG/DL
T4 FREE SERPL-MCNC: 1 NG/DL
THYROGLOB AB SERPL-ACNC: <20 IU/ML
THYROPEROXIDASE AB SERPL IA-ACNC: <10 IU/ML
TSH SERPL-ACNC: 5.18 UIU/ML

## 2020-08-25 ENCOUNTER — APPOINTMENT (OUTPATIENT)
Dept: SURGERY | Facility: CLINIC | Age: 55
End: 2020-08-25
Payer: COMMERCIAL

## 2020-08-25 ENCOUNTER — RX RENEWAL (OUTPATIENT)
Age: 55
End: 2020-08-25

## 2020-08-25 PROCEDURE — 99024 POSTOP FOLLOW-UP VISIT: CPT

## 2020-08-25 NOTE — PHYSICAL EXAM
[de-identified] : well healed scar [Midline] : located in midline position [Normal] : orientation to person, place, and time: normal

## 2020-08-26 LAB
T3 SERPL-MCNC: 82 NG/DL
T4 FREE SERPL-MCNC: 1.3 NG/DL
THYROGLOB AB SERPL-ACNC: <20 IU/ML
THYROPEROXIDASE AB SERPL IA-ACNC: 23.1 IU/ML
TSH SERPL-ACNC: 5.31 UIU/ML

## 2020-08-28 RX ORDER — METHYLPREDNISOLONE 4 MG/1
4 TABLET ORAL
Qty: 10 | Refills: 0 | Status: COMPLETED | COMMUNITY
Start: 2020-03-17

## 2020-08-28 RX ORDER — FLUOROMETHOLONE 1 MG/ML
0.1 SOLUTION/ DROPS OPHTHALMIC
Qty: 5 | Refills: 0 | Status: COMPLETED | COMMUNITY
Start: 2020-07-10

## 2020-08-28 RX ORDER — LEVOTHYROXINE SODIUM 0.05 MG/1
50 TABLET ORAL DAILY
Qty: 90 | Refills: 3 | Status: COMPLETED | COMMUNITY
Start: 2020-07-29 | End: 2020-08-28

## 2020-08-28 RX ORDER — CLINDAMYCIN PHOSPHATE 10 MG/ML
1 SOLUTION TOPICAL
Qty: 30 | Refills: 0 | Status: COMPLETED | COMMUNITY
Start: 2020-07-01

## 2020-08-28 RX ORDER — DOXYCYCLINE HYCLATE 100 MG/1
100 TABLET ORAL
Qty: 20 | Refills: 0 | Status: COMPLETED | COMMUNITY
Start: 2020-03-17

## 2020-08-28 RX ORDER — CIPROFLOXACIN AND DEXAMETHASONE 3; 1 MG/ML; MG/ML
0.3-0.1 SUSPENSION/ DROPS AURICULAR (OTIC)
Qty: 8 | Refills: 0 | Status: COMPLETED | COMMUNITY
Start: 2020-06-03

## 2020-08-28 RX ORDER — FUROSEMIDE 20 MG/1
20 TABLET ORAL
Qty: 90 | Refills: 0 | Status: COMPLETED | COMMUNITY
Start: 2020-06-22

## 2020-08-28 RX ORDER — TOBRAMYCIN AND DEXAMETHASONE 3; 1 MG/ML; MG/ML
0.3-0.1 SUSPENSION/ DROPS OPHTHALMIC
Qty: 5 | Refills: 0 | Status: COMPLETED | COMMUNITY
Start: 2020-03-13

## 2020-08-28 RX ORDER — METHYLPREDNISOLONE 4 MG/1
4 TABLET ORAL
Qty: 1 | Refills: 1 | Status: COMPLETED | COMMUNITY
Start: 2019-10-10 | End: 2020-08-28

## 2020-08-28 RX ORDER — FLUOCINOLONE ACETONIDE 0.11 MG/ML
0.01 OIL AURICULAR (OTIC)
Qty: 20 | Refills: 0 | Status: COMPLETED | COMMUNITY
Start: 2020-03-17

## 2020-09-14 ENCOUNTER — APPOINTMENT (OUTPATIENT)
Dept: ENDOCRINOLOGY | Facility: CLINIC | Age: 55
End: 2020-09-14
Payer: COMMERCIAL

## 2020-09-14 ENCOUNTER — APPOINTMENT (OUTPATIENT)
Dept: CARDIOLOGY | Facility: CLINIC | Age: 55
End: 2020-09-14
Payer: COMMERCIAL

## 2020-09-14 VITALS
DIASTOLIC BLOOD PRESSURE: 78 MMHG | OXYGEN SATURATION: 97 % | HEART RATE: 71 BPM | BODY MASS INDEX: 24.16 KG/M2 | SYSTOLIC BLOOD PRESSURE: 122 MMHG | WEIGHT: 145 LBS | TEMPERATURE: 98.4 F | HEIGHT: 65 IN

## 2020-09-14 DIAGNOSIS — R14.2 ERUCTATION: ICD-10-CM

## 2020-09-14 DIAGNOSIS — R00.2 PALPITATIONS: ICD-10-CM

## 2020-09-14 DIAGNOSIS — R30.0 DYSURIA: ICD-10-CM

## 2020-09-14 DIAGNOSIS — J45.909 UNSPECIFIED ASTHMA, UNCOMPLICATED: ICD-10-CM

## 2020-09-14 PROCEDURE — 99214 OFFICE O/P EST MOD 30 MIN: CPT

## 2020-09-14 RX ORDER — OMEPRAZOLE 40 MG/1
40 CAPSULE, DELAYED RELEASE ORAL
Qty: 30 | Refills: 3 | Status: ACTIVE | COMMUNITY
Start: 2020-09-14 | End: 1900-01-01

## 2020-09-15 ENCOUNTER — LABORATORY RESULT (OUTPATIENT)
Age: 55
End: 2020-09-15

## 2020-09-15 LAB
APPEARANCE: CLEAR
BASOPHILS # BLD AUTO: 0.04 K/UL
BASOPHILS NFR BLD AUTO: 0.5 %
BILIRUB DIRECT SERPL-MCNC: 0.1 MG/DL
BILIRUB INDIRECT SERPL-MCNC: 0.1 MG/DL
BILIRUBIN URINE: NEGATIVE
BLOOD URINE: NEGATIVE
CHOLEST SERPL-MCNC: 206 MG/DL
CHOLEST/HDLC SERPL: 3 RATIO
CK SERPL-CCNC: 102 U/L
COLOR: NORMAL
EOSINOPHIL # BLD AUTO: 0.4 K/UL
EOSINOPHIL NFR BLD AUTO: 4.7 %
ESTIMATED AVERAGE GLUCOSE: 120 MG/DL
GLUCOSE QUALITATIVE U: NEGATIVE
HBA1C MFR BLD HPLC: 5.8 %
HCT VFR BLD CALC: 48 %
HDLC SERPL-MCNC: 68 MG/DL
HGB BLD-MCNC: 15 G/DL
IMM GRANULOCYTES NFR BLD AUTO: 0.1 %
KETONES URINE: NEGATIVE
LDLC SERPL CALC-MCNC: 93 MG/DL
LDLC SERPL DIRECT ASSAY-MCNC: 103 MG/DL
LEUKOCYTE ESTERASE URINE: NEGATIVE
LYMPHOCYTES # BLD AUTO: 2.47 K/UL
LYMPHOCYTES NFR BLD AUTO: 29.2 %
MAGNESIUM SERPL-MCNC: 2.3 MG/DL
MAN DIFF?: NORMAL
MCHC RBC-ENTMCNC: 29.5 PG
MCHC RBC-ENTMCNC: 31.3 GM/DL
MCV RBC AUTO: 94.5 FL
MONOCYTES # BLD AUTO: 0.54 K/UL
MONOCYTES NFR BLD AUTO: 6.4 %
NEUTROPHILS # BLD AUTO: 4.99 K/UL
NEUTROPHILS NFR BLD AUTO: 59.1 %
NITRITE URINE: NEGATIVE
OSMOLALITY SERPL: 300 MOSMOL/KG
PH URINE: 6
PHOSPHATE SERPL-MCNC: 3.8 MG/DL
PLATELET # BLD AUTO: 229 K/UL
PROTEIN URINE: NEGATIVE
PSA SERPL-MCNC: 2.11 NG/ML
RBC # BLD: 5.08 M/UL
RBC # FLD: 13.2 %
SPECIFIC GRAVITY URINE: 1.01
T3RU NFR SERPL: 1 TBI
T4 SERPL-MCNC: 8.4 UG/DL
TRIGL SERPL-MCNC: 223 MG/DL
URATE SERPL-MCNC: 5.3 MG/DL
UROBILINOGEN URINE: NORMAL
WBC # FLD AUTO: 8.45 K/UL

## 2020-09-25 PROBLEM — R30.0 BURNING WITH URINATION: Status: ACTIVE | Noted: 2020-09-15

## 2020-09-25 NOTE — HISTORY OF PRESENT ILLNESS
[FreeTextEntry1] : The patient is complaining about recent onset palpitations occurring over the past couple of days. Palpitations occur during the daytime and evening hours. There are no associated syncope, lightheadedness or dizziness with them. The patient does not recall whether any activities exacerbate them, and they state that they are not worse with physical exertion. The patient denies excessive caffeine, alcohol, chocolate, ephedrine, dietary supplement, of over-the-counter medication use. There is no associate insomnia or irritability, or recreational drug use or use of prescription medication. Patient denies pedal edema, PND, dyspnea on exertion or orthopnea.\par

## 2020-09-25 NOTE — PHYSICAL EXAM
[General Appearance - Well Developed] : well developed [Normal Appearance] : normal appearance [Well Groomed] : well groomed [General Appearance - In No Acute Distress] : no acute distress [General Appearance - Well Nourished] : well nourished [No Deformities] : no deformities [Normal Conjunctiva] : the conjunctiva exhibited no abnormalities [Eyelids - No Xanthelasma] : the eyelids demonstrated no xanthelasmas [Normal Oral Mucosa] : normal oral mucosa [No Oral Cyanosis] : no oral cyanosis [Normal Jugular Venous V Waves Present] : normal jugular venous V waves present [Normal Jugular Venous A Waves Present] : normal jugular venous A waves present [No Oral Pallor] : no oral pallor [Respiration, Rhythm And Depth] : normal respiratory rhythm and effort [No Jugular Venous Sparrow A Waves] : no jugular venous sparrow A waves [Exaggerated Use Of Accessory Muscles For Inspiration] : no accessory muscle use [Auscultation Breath Sounds / Voice Sounds] : lungs were clear to auscultation bilaterally [Abdomen Soft] : soft [Abdomen Mass (___ Cm)] : no abdominal mass palpated [Abdomen Tenderness] : non-tender [Abnormal Walk] : normal gait [Gait - Sufficient For Exercise Testing] : the gait was sufficient for exercise testing [Nail Clubbing] : no clubbing of the fingernails [Petechial Hemorrhages (___cm)] : no petechial hemorrhages [Cyanosis, Localized] : no localized cyanosis [No Venous Stasis] : no venous stasis [Skin Color & Pigmentation] : normal skin color and pigmentation [] : no rash [No Xanthoma] : no  xanthoma was observed [Skin Lesions] : no skin lesions [No Skin Ulcers] : no skin ulcer [Oriented To Time, Place, And Person] : oriented to person, place, and time [No Anxiety] : not feeling anxious [Affect] : the affect was normal [Mood] : the mood was normal [FreeTextEntry1] : Regular rate and rhythm, NL S1, S2, non-displaced PMI, chest non-tender; no rubs,heaves  or gallops a  Grade 2/6 systolic murmur noted at the LSB

## 2020-09-26 PROBLEM — J45.909 ASTHMA: Status: ACTIVE | Noted: 2019-07-03

## 2020-09-26 NOTE — HISTORY OF PRESENT ILLNESS
[FreeTextEntry1] : Mr. Jim returns for endocrine follow up.  He did undergo US guided fna of a 1.1 x 1.0 cm rt interpolar nodule with lobulated/irregular margins.  The results of the fna,were c/w Papillary thyroid carcinoma.He did have rt lobectomy per Dr. Cyr in mid July The tumor was 1.3 cm max was without invasion two lymph nodes were neg.\par He too has a hx of asthma and is on Singular per pulmonologist.\par Denies anterior neck discomfort, difficulty swallowing, or any change in the appearance of the neck region.\par feels well except notes a pulling  sensation in neck region\par rt lobe and isthmus removed\par Now on LT4 75 mcg daily.-was on 50 mcg but tsh at 5.3 on 8/25-dose incr to 75 mcg.\par \par

## 2020-09-30 ENCOUNTER — NON-APPOINTMENT (OUTPATIENT)
Age: 55
End: 2020-09-30

## 2020-10-02 ENCOUNTER — APPOINTMENT (OUTPATIENT)
Dept: CARDIOLOGY | Facility: CLINIC | Age: 55
End: 2020-10-02
Payer: COMMERCIAL

## 2020-10-02 PROCEDURE — 93224 XTRNL ECG REC UP TO 48 HRS: CPT

## 2020-10-08 ENCOUNTER — APPOINTMENT (OUTPATIENT)
Dept: UROLOGY | Facility: CLINIC | Age: 55
End: 2020-10-08
Payer: COMMERCIAL

## 2020-10-08 VITALS
RESPIRATION RATE: 17 BRPM | BODY MASS INDEX: 24.75 KG/M2 | HEART RATE: 81 BPM | DIASTOLIC BLOOD PRESSURE: 60 MMHG | SYSTOLIC BLOOD PRESSURE: 96 MMHG | WEIGHT: 145 LBS | HEIGHT: 64 IN

## 2020-10-08 VITALS — TEMPERATURE: 97.2 F

## 2020-10-08 DIAGNOSIS — N48.89 OTHER SPECIFIED DISORDERS OF PENIS: ICD-10-CM

## 2020-10-08 DIAGNOSIS — R10.31 RIGHT LOWER QUADRANT PAIN: ICD-10-CM

## 2020-10-08 DIAGNOSIS — K40.90 UNILATERAL INGUINAL HERNIA, W/OUT OBSTRUCTION OR GANGRENE, NOT SPECIFIED AS RECURRENT: ICD-10-CM

## 2020-10-08 DIAGNOSIS — N41.0 ACUTE PROSTATITIS: ICD-10-CM

## 2020-10-08 DIAGNOSIS — M62.89 OTHER SPECIFIED DISORDERS OF MUSCLE: ICD-10-CM

## 2020-10-08 DIAGNOSIS — R35.0 FREQUENCY OF MICTURITION: ICD-10-CM

## 2020-10-08 PROCEDURE — 99203 OFFICE O/P NEW LOW 30 MIN: CPT

## 2020-10-08 RX ORDER — TAMSULOSIN HYDROCHLORIDE 0.4 MG/1
0.4 CAPSULE ORAL
Qty: 30 | Refills: 3 | Status: ACTIVE | COMMUNITY
Start: 2020-10-08 | End: 1900-01-01

## 2020-10-08 RX ORDER — SULFAMETHOXAZOLE AND TRIMETHOPRIM 800; 160 MG/1; MG/1
800-160 TABLET ORAL
Qty: 60 | Refills: 0 | Status: ACTIVE | COMMUNITY
Start: 2020-10-08 | End: 1900-01-01

## 2020-10-08 NOTE — HISTORY OF PRESENT ILLNESS
[FreeTextEntry1] : 55 y.o gentleman with cc of burning sensation in the tip of his penis, relieved with a void. He voids q 2 h - 3 h and nocturia x 2 , denies UI . Denies LUTS .Fluids : 6 -7 cups of water and 1 cup of tea. He also c/o right groin pain and had imaging few months back- We called [facility] and got the CT Urogram - right inguinal hernia, enlarged prostate 4.7  cm , no bladder calculus . UA , microscopic analysis ,Ureaplasma/mycoplasma- call for results .Last UA was WNL - 9/2020 \par  He had a course of Cipro 6-7 months ago and felt better with his ss of dysuria. PSA  2.11 from  9/2020\par  Refer to general surgeon for [facility] . \par  :meatal stenosis , no testicular tenderness \par PAMELA - 50  gms prostate , boggy probable prostatitis \par Trial of Flomax 0.4  mg daily for LUTS.\par Trial of Bactrim DS 1 po BID x 4 weeks for prostatitis.Add Probiotics and gummy fiber to regimen. \par RTO in 5 weeks .

## 2020-10-08 NOTE — PHYSICAL EXAM
[General Appearance - Well Developed] : well developed [General Appearance - Well Nourished] : well nourished [Normal Appearance] : normal appearance [Well Groomed] : well groomed [General Appearance - In No Acute Distress] : no acute distress [Edema] : no peripheral edema [Respiration, Rhythm And Depth] : normal respiratory rhythm and effort [Exaggerated Use Of Accessory Muscles For Inspiration] : no accessory muscle use [Abdomen Soft] : soft [Abdomen Tenderness] : non-tender [Costovertebral Angle Tenderness] : no ~M costovertebral angle tenderness [Normal Station and Gait] : the gait and station were normal for the patient's age [] : no rash [Oriented To Time, Place, And Person] : oriented to person, place, and time [Affect] : the affect was normal [Mood] : the mood was normal [Not Anxious] : not anxious [Testes Tenderness] : no tenderness of the testes [Testes Mass (___cm)] : there were no testicular masses [No Prostate Nodules] : no prostate nodules [Prostate Size ___ gm] : prostate size [unfilled] gm [Prostate Enlarged] : was enlarged [Prostate Tenderness] : was tender [Prostate Nodule] : did not have a nodule [Prostate Fluctuant] : was not fluctuant [FreeTextEntry1] : meatal stenosis ,  :meatal stenosis , no testicular tenderness \par PAMELA - 50  gms prostate , boggy \par  :meatal stenosis , no testicular tenderness \par PAMELA - 50  gms prostate , boggy probable prostatitis

## 2020-10-08 NOTE — ASSESSMENT
[FreeTextEntry1] : \par \par \par 55 y.o gentleman with cc of burning sensation in the tip of his penis, relieved with a void. He voids q 2 h - 3 h and nocturia x 2 , denies UI . Denies LUTS .Fluids : 6 -7 cups of water and 1 cup of tea. He also c/o right groin pain and had imaging few months back- We called [facility] and got the CT Urogram - right inguinal hernia, enlarged prostate 4.7  cm , no bladder calculus . UA , microscopic analysis ,Ureaplasma/mycoplasma- call for results .Last UA was WNL - 9/2020 .He had a course of Cipro 6-7 months ago and felt better with his ss of dysuria. PSA  2.11 from  9/2020. Refer to general surgeon for [facility] . \par  :meatal stenosis , no testicular tenderness \par PAMELA - 50  gms prostate , boggy probable prostatitis \par 1.Trial of Flomax 0.4  mg daily for LUTS.\par 2.Trial of Bactrim DS 1 po BID x 4 weeks for prostatitis.Add Probiotics and gummy fiber to regimen. \par 3.UA , microscopic analysis ,Ureaplasma/mycoplasma- call for results .\par 4. Refer to general surgeon for [facility] - Dr Osborn info given to pt. \par 5.RTO in 5 weeks .

## 2020-10-09 LAB
APPEARANCE: CLEAR
BACTERIA: NEGATIVE
BILIRUBIN URINE: NEGATIVE
BLOOD URINE: NEGATIVE
COLOR: COLORLESS
GLUCOSE QUALITATIVE U: NEGATIVE
HYALINE CASTS: 0 /LPF
KETONES URINE: NEGATIVE
LEUKOCYTE ESTERASE URINE: NEGATIVE
MICROSCOPIC-UA: NORMAL
NITRITE URINE: NEGATIVE
PH URINE: 6.5
PROTEIN URINE: NEGATIVE
RED BLOOD CELLS URINE: 0 /HPF
SPECIFIC GRAVITY URINE: 1.01
SQUAMOUS EPITHELIAL CELLS: 0 /HPF
UROBILINOGEN URINE: NORMAL
WHITE BLOOD CELLS URINE: 1 /HPF

## 2020-10-12 LAB — BACTERIA UR CULT: NORMAL

## 2020-10-21 LAB
MYCOPLASMA HOMINIS CULTURE: NORMAL
UREA SPECIMEN SOURCE: NORMAL
UREAPLASMA CULTURE: NORMAL
UREAPLASMA, PRELIMINARY CULTURE: NORMAL

## 2020-10-24 NOTE — REASON FOR VISIT
[Follow-Up: _____] : a [unfilled] follow-up visit - Cautiously started dysphagia 1 on 10/20/20  - Aspiration precautions

## 2020-10-29 ENCOUNTER — EMERGENCY (EMERGENCY)
Facility: HOSPITAL | Age: 55
LOS: 1 days | Discharge: HOME | End: 2020-10-29
Attending: STUDENT IN AN ORGANIZED HEALTH CARE EDUCATION/TRAINING PROGRAM | Admitting: STUDENT IN AN ORGANIZED HEALTH CARE EDUCATION/TRAINING PROGRAM
Payer: COMMERCIAL

## 2020-10-29 VITALS
TEMPERATURE: 98 F | SYSTOLIC BLOOD PRESSURE: 127 MMHG | HEART RATE: 58 BPM | DIASTOLIC BLOOD PRESSURE: 81 MMHG | OXYGEN SATURATION: 100 % | RESPIRATION RATE: 16 BRPM

## 2020-10-29 VITALS
OXYGEN SATURATION: 98 % | RESPIRATION RATE: 18 BRPM | WEIGHT: 145.95 LBS | HEIGHT: 65 IN | DIASTOLIC BLOOD PRESSURE: 73 MMHG | HEART RATE: 62 BPM | TEMPERATURE: 97 F | SYSTOLIC BLOOD PRESSURE: 106 MMHG

## 2020-10-29 DIAGNOSIS — Z90.89 ACQUIRED ABSENCE OF OTHER ORGANS: Chronic | ICD-10-CM

## 2020-10-29 DIAGNOSIS — Z98.890 OTHER SPECIFIED POSTPROCEDURAL STATES: Chronic | ICD-10-CM

## 2020-10-29 LAB
ALBUMIN SERPL ELPH-MCNC: 3.7 G/DL — SIGNIFICANT CHANGE UP (ref 3.3–5)
ALP SERPL-CCNC: 62 U/L — SIGNIFICANT CHANGE UP (ref 40–120)
ALT FLD-CCNC: 21 U/L — SIGNIFICANT CHANGE UP (ref 12–78)
ANION GAP SERPL CALC-SCNC: 4 MMOL/L — LOW (ref 5–17)
APPEARANCE UR: CLEAR — SIGNIFICANT CHANGE UP
AST SERPL-CCNC: 19 U/L — SIGNIFICANT CHANGE UP (ref 15–37)
BASOPHILS # BLD AUTO: 0.05 K/UL — SIGNIFICANT CHANGE UP (ref 0–0.2)
BASOPHILS NFR BLD AUTO: 0.8 % — SIGNIFICANT CHANGE UP (ref 0–2)
BILIRUB SERPL-MCNC: 0.3 MG/DL — SIGNIFICANT CHANGE UP (ref 0.2–1.2)
BILIRUB UR-MCNC: NEGATIVE — SIGNIFICANT CHANGE UP
BUN SERPL-MCNC: 11 MG/DL — SIGNIFICANT CHANGE UP (ref 7–23)
CALCIUM SERPL-MCNC: 9.1 MG/DL — SIGNIFICANT CHANGE UP (ref 8.5–10.1)
CHLORIDE SERPL-SCNC: 107 MMOL/L — SIGNIFICANT CHANGE UP (ref 96–108)
CO2 SERPL-SCNC: 28 MMOL/L — SIGNIFICANT CHANGE UP (ref 22–31)
COLOR SPEC: YELLOW — SIGNIFICANT CHANGE UP
CREAT SERPL-MCNC: 1.5 MG/DL — HIGH (ref 0.5–1.3)
DIFF PNL FLD: NEGATIVE — SIGNIFICANT CHANGE UP
EOSINOPHIL # BLD AUTO: 0.23 K/UL — SIGNIFICANT CHANGE UP (ref 0–0.5)
EOSINOPHIL NFR BLD AUTO: 3.6 % — SIGNIFICANT CHANGE UP (ref 0–6)
GLUCOSE SERPL-MCNC: 101 MG/DL — HIGH (ref 70–99)
GLUCOSE UR QL: NEGATIVE — SIGNIFICANT CHANGE UP
HCT VFR BLD CALC: 40.5 % — SIGNIFICANT CHANGE UP (ref 39–50)
HGB BLD-MCNC: 14.1 G/DL — SIGNIFICANT CHANGE UP (ref 13–17)
IMM GRANULOCYTES NFR BLD AUTO: 0.3 % — SIGNIFICANT CHANGE UP (ref 0–1.5)
KETONES UR-MCNC: NEGATIVE — SIGNIFICANT CHANGE UP
LEUKOCYTE ESTERASE UR-ACNC: NEGATIVE — SIGNIFICANT CHANGE UP
LIDOCAIN IGE QN: 206 U/L — SIGNIFICANT CHANGE UP (ref 73–393)
LYMPHOCYTES # BLD AUTO: 2.35 K/UL — SIGNIFICANT CHANGE UP (ref 1–3.3)
LYMPHOCYTES # BLD AUTO: 36.5 % — SIGNIFICANT CHANGE UP (ref 13–44)
MCHC RBC-ENTMCNC: 30.5 PG — SIGNIFICANT CHANGE UP (ref 27–34)
MCHC RBC-ENTMCNC: 34.8 GM/DL — SIGNIFICANT CHANGE UP (ref 32–36)
MCV RBC AUTO: 87.7 FL — SIGNIFICANT CHANGE UP (ref 80–100)
MONOCYTES # BLD AUTO: 0.52 K/UL — SIGNIFICANT CHANGE UP (ref 0–0.9)
MONOCYTES NFR BLD AUTO: 8.1 % — SIGNIFICANT CHANGE UP (ref 2–14)
NEUTROPHILS # BLD AUTO: 3.26 K/UL — SIGNIFICANT CHANGE UP (ref 1.8–7.4)
NEUTROPHILS NFR BLD AUTO: 50.7 % — SIGNIFICANT CHANGE UP (ref 43–77)
NITRITE UR-MCNC: NEGATIVE — SIGNIFICANT CHANGE UP
NRBC # BLD: 0 /100 WBCS — SIGNIFICANT CHANGE UP (ref 0–0)
PH UR: 7 — SIGNIFICANT CHANGE UP (ref 5–8)
PLATELET # BLD AUTO: 210 K/UL — SIGNIFICANT CHANGE UP (ref 150–400)
POTASSIUM SERPL-MCNC: 3.9 MMOL/L — SIGNIFICANT CHANGE UP (ref 3.5–5.3)
POTASSIUM SERPL-SCNC: 3.9 MMOL/L — SIGNIFICANT CHANGE UP (ref 3.5–5.3)
PROT SERPL-MCNC: 7.3 G/DL — SIGNIFICANT CHANGE UP (ref 6–8.3)
PROT UR-MCNC: NEGATIVE — SIGNIFICANT CHANGE UP
RBC # BLD: 4.62 M/UL — SIGNIFICANT CHANGE UP (ref 4.2–5.8)
RBC # FLD: 12.8 % — SIGNIFICANT CHANGE UP (ref 10.3–14.5)
SODIUM SERPL-SCNC: 139 MMOL/L — SIGNIFICANT CHANGE UP (ref 135–145)
SP GR SPEC: 1 — LOW (ref 1.01–1.02)
TROPONIN I SERPL-MCNC: <.015 NG/ML — SIGNIFICANT CHANGE UP (ref 0.01–0.04)
UROBILINOGEN FLD QL: NEGATIVE — SIGNIFICANT CHANGE UP
WBC # BLD: 6.43 K/UL — SIGNIFICANT CHANGE UP (ref 3.8–10.5)
WBC # FLD AUTO: 6.43 K/UL — SIGNIFICANT CHANGE UP (ref 3.8–10.5)

## 2020-10-29 PROCEDURE — 85025 COMPLETE CBC W/AUTO DIFF WBC: CPT

## 2020-10-29 PROCEDURE — 96361 HYDRATE IV INFUSION ADD-ON: CPT

## 2020-10-29 PROCEDURE — 81003 URINALYSIS AUTO W/O SCOPE: CPT

## 2020-10-29 PROCEDURE — 96374 THER/PROPH/DIAG INJ IV PUSH: CPT

## 2020-10-29 PROCEDURE — 99284 EMERGENCY DEPT VISIT MOD MDM: CPT

## 2020-10-29 PROCEDURE — 93005 ELECTROCARDIOGRAM TRACING: CPT

## 2020-10-29 PROCEDURE — 93010 ELECTROCARDIOGRAM REPORT: CPT

## 2020-10-29 PROCEDURE — 87086 URINE CULTURE/COLONY COUNT: CPT

## 2020-10-29 PROCEDURE — 76705 ECHO EXAM OF ABDOMEN: CPT

## 2020-10-29 PROCEDURE — 36415 COLL VENOUS BLD VENIPUNCTURE: CPT

## 2020-10-29 PROCEDURE — 80053 COMPREHEN METABOLIC PANEL: CPT

## 2020-10-29 PROCEDURE — 84484 ASSAY OF TROPONIN QUANT: CPT

## 2020-10-29 PROCEDURE — 71045 X-RAY EXAM CHEST 1 VIEW: CPT

## 2020-10-29 PROCEDURE — 99284 EMERGENCY DEPT VISIT MOD MDM: CPT | Mod: 25

## 2020-10-29 PROCEDURE — 83690 ASSAY OF LIPASE: CPT

## 2020-10-29 PROCEDURE — 71045 X-RAY EXAM CHEST 1 VIEW: CPT | Mod: 26

## 2020-10-29 PROCEDURE — 76705 ECHO EXAM OF ABDOMEN: CPT | Mod: 26

## 2020-10-29 RX ORDER — PANTOPRAZOLE SODIUM 20 MG/1
40 TABLET, DELAYED RELEASE ORAL ONCE
Refills: 0 | Status: COMPLETED | OUTPATIENT
Start: 2020-10-29 | End: 2020-10-29

## 2020-10-29 RX ORDER — LIDOCAINE 4 G/100G
10 CREAM TOPICAL ONCE
Refills: 0 | Status: COMPLETED | OUTPATIENT
Start: 2020-10-29 | End: 2020-10-29

## 2020-10-29 RX ORDER — SODIUM CHLORIDE 9 MG/ML
1000 INJECTION INTRAMUSCULAR; INTRAVENOUS; SUBCUTANEOUS ONCE
Refills: 0 | Status: COMPLETED | OUTPATIENT
Start: 2020-10-29 | End: 2020-10-29

## 2020-10-29 RX ORDER — SIMETHICONE 80 MG/1
1 TABLET, CHEWABLE ORAL
Qty: 30 | Refills: 0
Start: 2020-10-29 | End: 2020-11-07

## 2020-10-29 RX ORDER — FLUTICASONE FUROATE AND VILANTEROL TRIFENATATE 100; 25 UG/1; UG/1
1 POWDER RESPIRATORY (INHALATION)
Qty: 0 | Refills: 0 | DISCHARGE

## 2020-10-29 RX ADMIN — PANTOPRAZOLE SODIUM 40 MILLIGRAM(S): 20 TABLET, DELAYED RELEASE ORAL at 22:03

## 2020-10-29 RX ADMIN — SODIUM CHLORIDE 1000 MILLILITER(S): 9 INJECTION INTRAMUSCULAR; INTRAVENOUS; SUBCUTANEOUS at 23:03

## 2020-10-29 RX ADMIN — SODIUM CHLORIDE 1000 MILLILITER(S): 9 INJECTION INTRAMUSCULAR; INTRAVENOUS; SUBCUTANEOUS at 22:03

## 2020-10-29 RX ADMIN — LIDOCAINE 10 MILLILITER(S): 4 CREAM TOPICAL at 22:03

## 2020-10-29 RX ADMIN — Medication 30 MILLILITER(S): at 22:03

## 2020-10-29 NOTE — ED ADULT NURSE NOTE - OBJECTIVE STATEMENT
C/O epigastric and chest discomfort intermittent x several months. Reports excessive flatulence and belching.

## 2020-10-29 NOTE — ED ADULT TRIAGE NOTE - SOURCE OF INFORMATION
Today March 22, 2018 you received the    Typhoid - injectable. This vaccine is valid for two years.   .    These appointments can be made as a NURSE ONLY visit.    **It is very important for the vaccinations to be given on the scheduled day(s), this helps ensure you receive the full effectiveness of the vaccine.**    Please call [facility] with any questions [phone]    Thank you for visiting [facility]       Patient

## 2020-10-29 NOTE — ED PROVIDER NOTE - NSFOLLOWUPINSTRUCTIONS_ED_ALL_ED_FT
[facility]                                                                           Indigestion    Indigestion is a feeling of pain, discomfort, burning, or fullness in the upper part of your belly (abdomen). It can come and go. It may occur often or rarely. Indigestion tends to happen while you are eating or right after you have finished eating. Indigestion may be a symptom of another condition. It may be worse:  •At night.      •When bending over.      •While lying down.        Follow these instructions at home:      Eating and drinking      •Follow an eating plan as told by your doctor.     •You may need to avoid foods and drinks such as:  •Chocolate and cocoa.      •Peppermint and mint flavorings.      •Garlic and onions.      •Horseradish.    •Spicy and acidic foods, such as:  •Peppers.      •Chili powder and gao powder.       •Vinegar.       •Hot sauces and BBQ sauce.      •Citrus fruits, such as:  •Oranges.      •Ivis.      •Limes.      •Tomato-based foods, such as:  •Red sauce and pizza with red sauce.      •Chili.       •Salsa.      •Fried and fatty foods, such as:  •Donuts.       •French fries and potato chips.       •High-fat dressings.      •High-fat meats, such as:  •Hot dogs and sausage.      •Rib eye steak.       •Ham and chavarria.      •High-fat dairy items, such as:  •Whole milk.       •Butter.       •Cream cheese.        •Coffee and tea (with or without caffeine).      •Drinks that contain alcohol.      •Energy drinks and sports drinks.      •Carbonated drinks or sodas.      •Citrus fruit juices.        •Eat small meals often. Avoid eating large meals.      •Avoid drinking large amounts of liquid with your meals.      •Avoid eating meals during the 2–3 hours before bedtime.      •Avoid lying down right after you eat.      •Avoid exercise for 2 hours after you eat.        Lifestyle                   •Maintain a healthy weight. Ask your doctor what weight is healthy for you. If you need to lose weight, work with your doctor.    •Exercise for at least 30 minutes on 5 or more days each week, or as told by your doctor.  •Avoid exercises that include bending forward. This can make your symptoms worse.        •Wear loose clothes. Do not wear anything tight around your waist.      • Do not use any products that contain nicotine or tobacco, including cigarettes, e-cigarettes, and chewing tobacco. These can make your symptoms worse. If you need help quitting, ask your doctor.      •Raise (elevate) the head of your bed about 6 inches (15 cm) when you sleep.      •Try to lower your stress. If you need help doing this, ask your doctor.      General instructions   •Take over-the-counter and prescription medicines only as told by your doctor.   •Do not take aspirin, ibuprofen, or other NSAIDs unless your doctor says it is okay.        •Pay attention to any changes in your symptoms.      •Keep all follow-up visits as told by your doctor. This is important.        Contact a doctor if:    •You have new symptoms.      •You lose weight and you do not know why it is happening.      •You have trouble swallowing, or it hurts to swallow.      •Your symptoms do not get better with treatment.      •Your symptoms last for more than 2 days.      •You have a fever.      •You throw up (vomit).        Get help right away if:    •You have pain in your arms, neck, jaw, teeth, or back.      •You feel sweaty, dizzy, or light-headed.      •You pass out (faint).      •You have chest pain or shortness of breath.      •You cannot stop throwing up, or you throw up blood.      •Your poop (stool) is bloody or black.      •You have very bad pain in your belly.      These symptoms may represent a serious problem that is an emergency. Do not wait to see if the symptoms will go away. Get medical help right away. Call your local emergency services (911 in the [place] Do not drive yourself to the hospital.       Summary    •Indigestion is a feeling of pain, discomfort, burning, or fullness in the upper part of your belly. It tends to happen while you are eating or right after you have finished eating.      •Follow an eating plan and other lifestyle changes as told by your doctor.      •Take over-the-counter and prescription medicines only as told by your doctor. Do not take aspirin, ibuprofen, or other NSAIDs unless your doctor says it is okay.      •Contact your doctor if your symptoms do not get better or they get worse.      •Some symptoms may represent a serious problem that is an emergency. Do not wait to see if the symptoms will go away. Get medical help right away.      This information is not intended to replace advice given to you by your health care provider. Make sure you discuss any questions you have with your health care provider.      Document Released: 01/20/2012 Document Revised: 05/20/2019 Document Reviewed: 05/20/2019    Elsevier Patient Education © 2020 [employer]

## 2020-10-29 NOTE — ED ADULT NURSE REASSESSMENT NOTE - NS ED NURSE REASSESS COMMENT FT1
Report received from CARLINE Cobb in [facility].  Patient is pending disposition from the ER.

## 2020-10-29 NOTE — ED PROVIDER NOTE - ATTENDING CONTRIBUTION TO CARE
I, Rachael Batista DO, personally saw the patient with ACP.  I have personally performed a face to face diagnostic evaluation on this patient.  I have reviewed the ACP note and agree with the history, exam, and plan of care, except as noted.     55 M with history of chest pain / abdominal pain with extensive work up with cardio and GI here with burping for 2 days.    Exam:  Vital signs as available reviewed.  General:  Comfortable, no acute distress.  Head:  Normocephalic, atraumatic.  Eyes:  Conjunctiva pink, no icterus.  Cardiovascular:  Regular rate, no obvious murmur.  Respiratory:  Clear to auscultation, good air entry bilaterally.  Abdomen:  Soft, non-tender.  Musculoskeletal:  No deformity or calf tenderness.  Neurologic: Alert and oriented, moving all extremities.  Skin:  Warm and dry.     r/o ACS, GB pathology.

## 2020-10-29 NOTE — ED ADULT TRIAGE NOTE - CHIEF COMPLAINT QUOTE
epigastric burning and pain x several weeks/ "I have so much gas"  had endoscopy about one week ago, still waiting on results/ has been taking omeprazole as needed  unremarkable EKG with cardiologist about one month ago

## 2020-10-29 NOTE — ED PROVIDER NOTE - CARE PROVIDER_API CALL
[place] [address]  Phone: [phone]  Fax: [phone]  Established Patient  Follow Up Time: 4-6 Days

## 2020-10-29 NOTE — ED PROVIDER NOTE - OBJECTIVE STATEMENT
56 yo male with h/o partial thyroidectomy, asthma, borderline DM presents to the ED c/o epigastric and chest discomfort intermittent x several months. Associated with increased gas and bletching. Patient has been evaluated by GI and cardiology. Patient had endoscopy 1 week ago - pending results and cardiac workup including stress test in July and then saw cardiology again 1 month ago, had ekg and holter monitor - all unremarkable. Patient takeds omeprazole occasionally. Epigastric abdominal pain described as tightness, nonradiating. Currently patient denies chest pain. Denies fever, chills, sob, N/V/D, urinary sxs, flank pain.     cardiologist Dr. Rich Lucas.

## 2020-10-29 NOTE — ED ADULT NURSE NOTE - PMH
Asthma    Cervical herniated disc    Hypothyroid    Left inguinal hernia    Malignant neoplasm of thyroid gland    Prediabetes  "borderline", per pt

## 2020-10-29 NOTE — ED PROVIDER NOTE - PATIENT PORTAL LINK FT
You can access the FollowMyHealth Patient Portal offered by [facility] by registering at the following website: [email or web address]. By joining [employer]’s FollowMyHealth portal, you will also be able to view your health information using other applications (apps) compatible with our system.

## 2020-10-29 NOTE — ED PROVIDER NOTE - CLINICAL SUMMARY MEDICAL DECISION MAKING FREE TEXT BOX
54 yo male with h/o partial thyroidectomy, asthma, borderline DM presents to the ED c/o epigastric and chest discomfort intermittent x several months. A/P: ekg, labs, RUQ US, meds, reassess

## 2020-10-29 NOTE — ED ADULT NURSE NOTE - CHPI ED NUR SYMPTOMS NEG
no vomiting/no blood in stool/no fever/no hematuria/no burning urination/no dysuria/no diarrhea/no abdominal distension/no chills

## 2020-10-30 LAB
CULTURE RESULTS: NO GROWTH — SIGNIFICANT CHANGE UP
SPECIMEN SOURCE: SIGNIFICANT CHANGE UP

## 2020-11-05 ENCOUNTER — APPOINTMENT (OUTPATIENT)
Dept: ENDOCRINOLOGY | Facility: CLINIC | Age: 55
End: 2020-11-05
Payer: COMMERCIAL

## 2020-11-05 ENCOUNTER — LABORATORY RESULT (OUTPATIENT)
Age: 55
End: 2020-11-05

## 2020-11-05 ENCOUNTER — APPOINTMENT (OUTPATIENT)
Dept: UROLOGY | Facility: CLINIC | Age: 55
End: 2020-11-05

## 2020-11-05 VITALS
HEART RATE: 84 BPM | SYSTOLIC BLOOD PRESSURE: 110 MMHG | TEMPERATURE: 97.9 F | WEIGHT: 144 LBS | OXYGEN SATURATION: 97 % | HEIGHT: 64 IN | BODY MASS INDEX: 24.59 KG/M2 | DIASTOLIC BLOOD PRESSURE: 75 MMHG

## 2020-11-05 DIAGNOSIS — E03.9 HYPOTHYROIDISM, UNSPECIFIED: ICD-10-CM

## 2020-11-05 DIAGNOSIS — R06.00 DYSPNEA, UNSPECIFIED: ICD-10-CM

## 2020-11-05 PROCEDURE — 76536 US EXAM OF HEAD AND NECK: CPT

## 2020-11-05 PROCEDURE — 36415 COLL VENOUS BLD VENIPUNCTURE: CPT

## 2020-11-05 PROCEDURE — 99072 ADDL SUPL MATRL&STAF TM PHE: CPT

## 2020-11-05 PROCEDURE — 99214 OFFICE O/P EST MOD 30 MIN: CPT | Mod: 25

## 2020-11-05 NOTE — PROCEDURE
[Loccie e 2008 model, 10-12 MHz frequencies] : multiple real time longitudinal and transverse images were obtained using a high resolution ultrasound with a linear transducer, Loccie e 2008 model, 10-12 MHz frequencies. All measurements will be reported as longitudinal x bere-posterior x transverse. [Thyroid Nodule] : thyroid nodule [] : a heterogeneous parenchyma [de-identified] : HX OF RIGHT Lobectomy [FreeTextEntry5] : 2.88 X 158  X 1.67 [FreeTextEntry2] : 0.47 [FreeTextEntry4] : NO SIGNIFICANT THYROID Tissues AND NO ABNORMAL Adenopathy

## 2020-11-05 NOTE — PROCEDURE
[Radial Network e 2008 model, 10-12 MHz frequencies] : multiple real time longitudinal and transverse images were obtained using a high resolution ultrasound with a linear transducer, Radial Network e 2008 model, 10-12 MHz frequencies. All measurements will be reported as longitudinal x bere-posterior x transverse. [Thyroid Nodule] : thyroid nodule [] : a heterogeneous parenchyma [de-identified] : HX OF RIGHT Lobectomy [FreeTextEntry5] : 2.88 X 158  X 1.67 [FreeTextEntry2] : 0.47 [FreeTextEntry4] : NO SIGNIFICANT THYROID Tissues AND NO ABNORMAL Adenopathy

## 2020-11-05 NOTE — IMPRESSION
[FreeTextEntry1] : hX OF RT LOBECTOMY-NO ABNORMAL ln PRESENT.  LEFT LOBE Dan RESIDUAL ISTHMUS WNL-NO NODULARITY NO ABNORMAL ADENOPATHY. F/U 9-12 MONTH

## 2020-11-05 NOTE — HISTORY OF PRESENT ILLNESS
[FreeTextEntry1] : Mr. Jim returns for endocrine follow up.  He did undergo US guided fna of a 1.1 x 1.0 cm rt interpolar nodule with lobulated/irregular margins.  The results of the fna,were c/w Papillary thyroid carcinoma.He did have rt lobectomy per Dr. Cyr in mid July The tumor was 1.3 cm max was without invasion two lymph nodes were neg.\par He too has a hx of asthma and is on Singular per pulmonologist.\par Denies anterior neck discomfort, difficulty swallowing, or any change in the appearance of the neck region.\par feels well except notes a pulling  sensation in neck region\par rt lobe and isthmus removed\par Continues on LT4 75 mcg daily.-   taook some 50 mcg this week but moslty was on 75 mcg.\par had egd todl neg so far\par \par

## 2020-11-16 ENCOUNTER — APPOINTMENT (OUTPATIENT)
Dept: CARDIOLOGY | Facility: CLINIC | Age: 55
End: 2020-11-16

## 2020-11-24 ENCOUNTER — APPOINTMENT (OUTPATIENT)
Dept: SURGERY | Facility: CLINIC | Age: 55
End: 2020-11-24

## 2020-12-14 ENCOUNTER — APPOINTMENT (OUTPATIENT)
Dept: SURGERY | Facility: CLINIC | Age: 55
End: 2020-12-14

## 2020-12-15 ENCOUNTER — NON-APPOINTMENT (OUTPATIENT)
Age: 55
End: 2020-12-15

## 2021-01-01 LAB
25(OH)D3 SERPL-MCNC: 56 NG/ML
ALBUMIN SERPL ELPH-MCNC: 4.6 G/DL
ALP BLD-CCNC: 66 U/L
ALT SERPL-CCNC: 12 U/L
ANION GAP SERPL CALC-SCNC: 16 MMOL/L
AST SERPL-CCNC: 16 U/L
BASOPHILS # BLD AUTO: 0.05 K/UL
BASOPHILS NFR BLD AUTO: 0.9 %
BILIRUB SERPL-MCNC: 0.2 MG/DL
BUN SERPL-MCNC: 10 MG/DL
CALCIUM SERPL-MCNC: 9.9 MG/DL
CHLORIDE SERPL-SCNC: 103 MMOL/L
CHOLEST SERPL-MCNC: 164 MG/DL
CO2 SERPL-SCNC: 21 MMOL/L
CREAT SERPL-MCNC: 1.02 MG/DL
EOSINOPHIL # BLD AUTO: 0.31 K/UL
EOSINOPHIL NFR BLD AUTO: 5.4 %
GLUCOSE SERPL-MCNC: 96 MG/DL
HCT VFR BLD CALC: 44.8 %
HDLC SERPL-MCNC: 61 MG/DL
HGB BLD-MCNC: 14.4 G/DL
IMM GRANULOCYTES NFR BLD AUTO: 0 %
LDLC SERPL CALC-MCNC: 71 MG/DL
LYMPHOCYTES # BLD AUTO: 2 K/UL
LYMPHOCYTES NFR BLD AUTO: 34.6 %
MAN DIFF?: NORMAL
MCHC RBC-ENTMCNC: 29.9 PG
MCHC RBC-ENTMCNC: 32.1 GM/DL
MCV RBC AUTO: 93.1 FL
MONOCYTES # BLD AUTO: 0.49 K/UL
MONOCYTES NFR BLD AUTO: 8.5 %
NEUTROPHILS # BLD AUTO: 2.93 K/UL
NEUTROPHILS NFR BLD AUTO: 50.6 %
NONHDLC SERPL-MCNC: 102 MG/DL
PLATELET # BLD AUTO: 211 K/UL
POTASSIUM SERPL-SCNC: 4.5 MMOL/L
PROT SERPL-MCNC: 7.1 G/DL
RBC # BLD: 4.81 M/UL
RBC # FLD: 12.9 %
SARS-COV-2 IGG SERPL IA-ACNC: 0.09 INDEX
SARS-COV-2 IGG SERPL QL IA: NEGATIVE
SODIUM SERPL-SCNC: 139 MMOL/L
T3FREE SERPL-MCNC: 2.52 PG/ML
T4 FREE SERPL-MCNC: 1.5 NG/DL
THYROGLOB AB SERPL-ACNC: <20 IU/ML
THYROGLOB SERPL-MCNC: 3.72 NG/ML
TRIGL SERPL-MCNC: 158 MG/DL
TSH SERPL-ACNC: 1.81 UIU/ML
WBC # FLD AUTO: 5.78 K/UL

## 2021-02-23 ENCOUNTER — LABORATORY RESULT (OUTPATIENT)
Age: 56
End: 2021-02-23

## 2021-02-23 ENCOUNTER — APPOINTMENT (OUTPATIENT)
Dept: SURGERY | Facility: CLINIC | Age: 56
End: 2021-02-23
Payer: COMMERCIAL

## 2021-02-23 PROCEDURE — 36415 COLL VENOUS BLD VENIPUNCTURE: CPT

## 2021-02-23 PROCEDURE — 99072 ADDL SUPL MATRL&STAF TM PHE: CPT

## 2021-02-23 PROCEDURE — 99213 OFFICE O/P EST LOW 20 MIN: CPT

## 2021-02-23 NOTE — HISTORY OF PRESENT ILLNESS
[de-identified] : Pt 6 months s/p thyroid lobectomy for malignancy on Synthroid 75 mcg states he feels fatigued and weak

## 2021-02-23 NOTE — PHYSICAL EXAM
[de-identified] : well healed scar [Midline] : located in midline position [Normal] : orientation to person, place, and time: normal

## 2021-02-24 ENCOUNTER — NON-APPOINTMENT (OUTPATIENT)
Age: 56
End: 2021-02-24

## 2021-02-24 LAB
T3 SERPL-MCNC: 87 NG/DL
T4 FREE SERPL-MCNC: 1.3 NG/DL
THYROGLOB AB SERPL-ACNC: <20 IU/ML
THYROGLOB SERPL-MCNC: 5.76 NG/ML
TSH SERPL-ACNC: 1.64 UIU/ML

## 2021-03-02 ENCOUNTER — NON-APPOINTMENT (OUTPATIENT)
Age: 56
End: 2021-03-02

## 2021-03-02 RX ORDER — LEVOTHYROXINE SODIUM 0.07 MG/1
75 TABLET ORAL DAILY
Qty: 90 | Refills: 2 | Status: ACTIVE | COMMUNITY
Start: 2020-08-28 | End: 1900-01-01

## 2021-04-12 ENCOUNTER — NON-APPOINTMENT (OUTPATIENT)
Age: 56
End: 2021-04-12

## 2021-04-21 ENCOUNTER — NON-APPOINTMENT (OUTPATIENT)
Age: 56
End: 2021-04-21

## 2021-04-22 ENCOUNTER — NON-APPOINTMENT (OUTPATIENT)
Age: 56
End: 2021-04-22

## 2021-06-09 ENCOUNTER — APPOINTMENT (OUTPATIENT)
Dept: SURGERY | Facility: CLINIC | Age: 56
End: 2021-06-09

## 2021-08-24 ENCOUNTER — APPOINTMENT (OUTPATIENT)
Dept: SURGERY | Facility: CLINIC | Age: 56
End: 2021-08-24

## 2021-09-23 ENCOUNTER — APPOINTMENT (OUTPATIENT)
Dept: SURGERY | Facility: CLINIC | Age: 56
End: 2021-09-23

## 2021-09-27 ENCOUNTER — APPOINTMENT (OUTPATIENT)
Dept: SURGERY | Facility: CLINIC | Age: 56
End: 2021-09-27
Payer: COMMERCIAL

## 2021-09-27 VITALS
TEMPERATURE: 97.6 F | HEART RATE: 73 BPM | HEIGHT: 65 IN | BODY MASS INDEX: 23.32 KG/M2 | WEIGHT: 140 LBS | DIASTOLIC BLOOD PRESSURE: 74 MMHG | SYSTOLIC BLOOD PRESSURE: 116 MMHG

## 2021-09-27 PROCEDURE — 99203 OFFICE O/P NEW LOW 30 MIN: CPT

## 2021-09-27 NOTE — HISTORY OF PRESENT ILLNESS
[de-identified] : 55 yo male presents for evaluation of a left groin bulge. He states he also has intermittent sensation in the right groin, as well as feeling a mass in the right groin. He denies any nausea or vomiting. No h/o hernia repair in the past. Patient states urinary issues and follows up with Urology. He has a CT in the past that shows bilateral inguinal hernias.

## 2021-09-27 NOTE — ASSESSMENT
[FreeTextEntry1] : 57 yo male with bilateral inguinal hernias as well as lipoma of right groin. Will schedule laps BIH and excision of lipoma.

## 2021-09-27 NOTE — PHYSICAL EXAM
[Respiratory Effort] : normal respiratory effort [Alert] : alert [Oriented to Person] : oriented to person [Oriented to Place] : oriented to place [Calm] : calm [Oriented to Time] : oriented to time [JVD] : no jugular venous distention  [Abdominal Masses] : No abdominal masses [Abdomen Tenderness] : ~T ~M No abdominal tenderness [de-identified] : KAYLA PETTY NAD [de-identified] : EOMI [de-identified] : soft NTND + obvious LIH ? smaller RIH as well as lipoma of right groin

## 2022-06-06 ENCOUNTER — APPOINTMENT (OUTPATIENT)
Dept: SURGERY | Facility: CLINIC | Age: 57
End: 2022-06-06

## 2022-07-18 ENCOUNTER — APPOINTMENT (OUTPATIENT)
Dept: SURGERY | Facility: CLINIC | Age: 57
End: 2022-07-18

## 2022-07-25 DIAGNOSIS — E04.1 NONTOXIC SINGLE THYROID NODULE: ICD-10-CM

## 2022-08-25 ENCOUNTER — APPOINTMENT (OUTPATIENT)
Dept: SURGERY | Facility: CLINIC | Age: 57
End: 2022-08-25

## 2022-08-25 VITALS
HEART RATE: 70 BPM | HEIGHT: 65 IN | BODY MASS INDEX: 22.69 KG/M2 | WEIGHT: 136.19 LBS | TEMPERATURE: 97.3 F | SYSTOLIC BLOOD PRESSURE: 120 MMHG | OXYGEN SATURATION: 96 % | DIASTOLIC BLOOD PRESSURE: 66 MMHG

## 2022-08-25 DIAGNOSIS — L72.3 SEBACEOUS CYST: ICD-10-CM

## 2022-08-25 DIAGNOSIS — K40.20 BILATERAL INGUINAL HERNIA, W/OUT OBSTRUCTION OR GANGRENE, NOT SPECIFIED AS RECURRENT: ICD-10-CM

## 2022-08-25 DIAGNOSIS — D17.9 BENIGN LIPOMATOUS NEOPLASM, UNSPECIFIED: ICD-10-CM

## 2022-08-25 PROCEDURE — 99214 OFFICE O/P EST MOD 30 MIN: CPT

## 2022-08-25 NOTE — PHYSICAL EXAM
[Alert] : alert [Oriented to Person] : oriented to person [Oriented to Place] : oriented to place [Oriented to Time] : oriented to time [Calm] : calm [de-identified] : Well-appearing [de-identified] : Normal [de-identified] : Bilateral inguinal hernia\par Epigastric sebaceous cyst [de-identified] : Lipomatous lesion in the right lower quadrant

## 2022-08-25 NOTE — HISTORY OF PRESENT ILLNESS
[de-identified] : 57-year-old male who presents to the office for evaluation of left-sided inguinal hernia.  He was also evaluated few years ago for right side inguinal hernia as well.  Also states that he has epigastric mass which was advised by his dermatologist to remove by general surgeon.  He denies any obstructive symptoms associated with hernia.

## 2022-08-25 NOTE — ASSESSMENT
[FreeTextEntry1] : 57-year-old male with bilateral ventral hernia, sebaceous cyst in the mid epigastrium, and lipoma of the right lower quadrant.\par Plan: Robotic assisted bilateral and hernia repair with mesh, excision of sebaceous cyst, and excision lipoma\par All risks benefits alternatives were explained and the patient expressed full understanding.\par All questions were answered in detail and the patient expressed full understanding.

## 2022-08-30 ENCOUNTER — APPOINTMENT (OUTPATIENT)
Dept: UROLOGY | Facility: CLINIC | Age: 57
End: 2022-08-30

## 2022-09-23 ENCOUNTER — APPOINTMENT (OUTPATIENT)
Dept: SURGERY | Facility: HOSPITAL | Age: 57
End: 2022-09-23

## 2022-11-11 ENCOUNTER — APPOINTMENT (OUTPATIENT)
Dept: SURGERY | Facility: HOSPITAL | Age: 57
End: 2022-11-11

## 2022-12-13 ENCOUNTER — APPOINTMENT (OUTPATIENT)
Dept: SURGERY | Facility: CLINIC | Age: 57
End: 2022-12-13

## 2022-12-26 ENCOUNTER — NON-APPOINTMENT (OUTPATIENT)
Age: 57
End: 2022-12-26

## 2023-08-16 ENCOUNTER — EMERGENCY (EMERGENCY)
Facility: HOSPITAL | Age: 58
LOS: 1 days | Discharge: HOME | End: 2023-08-16
Attending: STUDENT IN AN ORGANIZED HEALTH CARE EDUCATION/TRAINING PROGRAM | Admitting: STUDENT IN AN ORGANIZED HEALTH CARE EDUCATION/TRAINING PROGRAM
Payer: COMMERCIAL

## 2023-08-16 VITALS
OXYGEN SATURATION: 96 % | DIASTOLIC BLOOD PRESSURE: 88 MMHG | SYSTOLIC BLOOD PRESSURE: 146 MMHG | HEIGHT: 65 IN | HEART RATE: 77 BPM | WEIGHT: 145.95 LBS | RESPIRATION RATE: 18 BRPM | TEMPERATURE: 99 F

## 2023-08-16 VITALS
HEART RATE: 79 BPM | RESPIRATION RATE: 18 BRPM | DIASTOLIC BLOOD PRESSURE: 82 MMHG | TEMPERATURE: 98 F | OXYGEN SATURATION: 96 % | SYSTOLIC BLOOD PRESSURE: 122 MMHG

## 2023-08-16 DIAGNOSIS — Z98.890 OTHER SPECIFIED POSTPROCEDURAL STATES: Chronic | ICD-10-CM

## 2023-08-16 DIAGNOSIS — Z90.89 ACQUIRED ABSENCE OF OTHER ORGANS: Chronic | ICD-10-CM

## 2023-08-16 LAB
ALBUMIN SERPL ELPH-MCNC: 3.7 G/DL — SIGNIFICANT CHANGE UP (ref 3.3–5)
ALP SERPL-CCNC: 61 U/L — SIGNIFICANT CHANGE UP (ref 40–120)
ALT FLD-CCNC: 29 U/L — SIGNIFICANT CHANGE UP (ref 12–78)
ANION GAP SERPL CALC-SCNC: 6 MMOL/L — SIGNIFICANT CHANGE UP (ref 5–17)
AST SERPL-CCNC: 21 U/L — SIGNIFICANT CHANGE UP (ref 15–37)
BASOPHILS # BLD AUTO: 0.03 K/UL — SIGNIFICANT CHANGE UP (ref 0–0.2)
BASOPHILS NFR BLD AUTO: 0.5 % — SIGNIFICANT CHANGE UP (ref 0–2)
BILIRUB SERPL-MCNC: 0.4 MG/DL — SIGNIFICANT CHANGE UP (ref 0.2–1.2)
BUN SERPL-MCNC: 13 MG/DL — SIGNIFICANT CHANGE UP (ref 7–23)
CALCIUM SERPL-MCNC: 8.9 MG/DL — SIGNIFICANT CHANGE UP (ref 8.5–10.1)
CHLORIDE SERPL-SCNC: 103 MMOL/L — SIGNIFICANT CHANGE UP (ref 96–108)
CO2 SERPL-SCNC: 26 MMOL/L — SIGNIFICANT CHANGE UP (ref 22–31)
CREAT SERPL-MCNC: 1.2 MG/DL — SIGNIFICANT CHANGE UP (ref 0.5–1.3)
D DIMER BLD IA.RAPID-MCNC: <150 NG/ML DDU — SIGNIFICANT CHANGE UP
EGFR: 71 ML/MIN/1.73M2 — SIGNIFICANT CHANGE UP
EOSINOPHIL # BLD AUTO: 0.24 K/UL — SIGNIFICANT CHANGE UP (ref 0–0.5)
EOSINOPHIL NFR BLD AUTO: 4.1 % — SIGNIFICANT CHANGE UP (ref 0–6)
FLUAV AG NPH QL: SIGNIFICANT CHANGE UP
FLUBV AG NPH QL: SIGNIFICANT CHANGE UP
GLUCOSE SERPL-MCNC: 120 MG/DL — HIGH (ref 70–99)
HCT VFR BLD CALC: 41 % — SIGNIFICANT CHANGE UP (ref 39–50)
HGB BLD-MCNC: 13.5 G/DL — SIGNIFICANT CHANGE UP (ref 13–17)
IMM GRANULOCYTES NFR BLD AUTO: 0.5 % — SIGNIFICANT CHANGE UP (ref 0–0.9)
LACTATE SERPL-SCNC: 1.2 MMOL/L — SIGNIFICANT CHANGE UP (ref 0.7–2)
LIDOCAIN IGE QN: 144 U/L — SIGNIFICANT CHANGE UP (ref 73–393)
LYMPHOCYTES # BLD AUTO: 0.93 K/UL — LOW (ref 1–3.3)
LYMPHOCYTES # BLD AUTO: 15.7 % — SIGNIFICANT CHANGE UP (ref 13–44)
MAGNESIUM SERPL-MCNC: 2.1 MG/DL — SIGNIFICANT CHANGE UP (ref 1.6–2.6)
MCHC RBC-ENTMCNC: 30.5 PG — SIGNIFICANT CHANGE UP (ref 27–34)
MCHC RBC-ENTMCNC: 32.9 GM/DL — SIGNIFICANT CHANGE UP (ref 32–36)
MCV RBC AUTO: 92.6 FL — SIGNIFICANT CHANGE UP (ref 80–100)
MONOCYTES # BLD AUTO: 0.73 K/UL — SIGNIFICANT CHANGE UP (ref 0–0.9)
MONOCYTES NFR BLD AUTO: 12.3 % — SIGNIFICANT CHANGE UP (ref 2–14)
NEUTROPHILS # BLD AUTO: 3.96 K/UL — SIGNIFICANT CHANGE UP (ref 1.8–7.4)
NEUTROPHILS NFR BLD AUTO: 66.9 % — SIGNIFICANT CHANGE UP (ref 43–77)
NRBC # BLD: 0 /100 WBCS — SIGNIFICANT CHANGE UP (ref 0–0)
NT-PROBNP SERPL-SCNC: 19 PG/ML — SIGNIFICANT CHANGE UP (ref 0–125)
PLATELET # BLD AUTO: 189 K/UL — SIGNIFICANT CHANGE UP (ref 150–400)
POTASSIUM SERPL-MCNC: 3.9 MMOL/L — SIGNIFICANT CHANGE UP (ref 3.5–5.3)
POTASSIUM SERPL-SCNC: 3.9 MMOL/L — SIGNIFICANT CHANGE UP (ref 3.5–5.3)
PROT SERPL-MCNC: 7.4 G/DL — SIGNIFICANT CHANGE UP (ref 6–8.3)
RBC # BLD: 4.43 M/UL — SIGNIFICANT CHANGE UP (ref 4.2–5.8)
RBC # FLD: 13.2 % — SIGNIFICANT CHANGE UP (ref 10.3–14.5)
RSV RNA NPH QL NAA+NON-PROBE: SIGNIFICANT CHANGE UP
SARS-COV-2 RNA SPEC QL NAA+PROBE: SIGNIFICANT CHANGE UP
SODIUM SERPL-SCNC: 135 MMOL/L — SIGNIFICANT CHANGE UP (ref 135–145)
TROPONIN I, HIGH SENSITIVITY RESULT: 4.4 NG/L — SIGNIFICANT CHANGE UP
WBC # BLD: 5.92 K/UL — SIGNIFICANT CHANGE UP (ref 3.8–10.5)
WBC # FLD AUTO: 5.92 K/UL — SIGNIFICANT CHANGE UP (ref 3.8–10.5)

## 2023-08-16 PROCEDURE — 85025 COMPLETE CBC W/AUTO DIFF WBC: CPT

## 2023-08-16 PROCEDURE — 99285 EMERGENCY DEPT VISIT HI MDM: CPT | Mod: 25

## 2023-08-16 PROCEDURE — 99285 EMERGENCY DEPT VISIT HI MDM: CPT

## 2023-08-16 PROCEDURE — 93005 ELECTROCARDIOGRAM TRACING: CPT

## 2023-08-16 PROCEDURE — 80053 COMPREHEN METABOLIC PANEL: CPT

## 2023-08-16 PROCEDURE — 83880 ASSAY OF NATRIURETIC PEPTIDE: CPT

## 2023-08-16 PROCEDURE — 85379 FIBRIN DEGRADATION QUANT: CPT

## 2023-08-16 PROCEDURE — 71045 X-RAY EXAM CHEST 1 VIEW: CPT

## 2023-08-16 PROCEDURE — 71045 X-RAY EXAM CHEST 1 VIEW: CPT | Mod: 26

## 2023-08-16 PROCEDURE — 36415 COLL VENOUS BLD VENIPUNCTURE: CPT

## 2023-08-16 PROCEDURE — 83735 ASSAY OF MAGNESIUM: CPT

## 2023-08-16 PROCEDURE — 71250 CT THORAX DX C-: CPT | Mod: 26,MA

## 2023-08-16 PROCEDURE — 87637 SARSCOV2&INF A&B&RSV AMP PRB: CPT

## 2023-08-16 PROCEDURE — 71250 CT THORAX DX C-: CPT | Mod: MA

## 2023-08-16 PROCEDURE — 84484 ASSAY OF TROPONIN QUANT: CPT

## 2023-08-16 PROCEDURE — 83605 ASSAY OF LACTIC ACID: CPT

## 2023-08-16 PROCEDURE — 83690 ASSAY OF LIPASE: CPT

## 2023-08-16 PROCEDURE — 93010 ELECTROCARDIOGRAM REPORT: CPT

## 2023-08-16 RX ORDER — MONTELUKAST 4 MG/1
1 TABLET, CHEWABLE ORAL
Qty: 0 | Refills: 0 | DISCHARGE

## 2023-08-16 RX ORDER — SODIUM CHLORIDE 9 MG/ML
1000 INJECTION INTRAMUSCULAR; INTRAVENOUS; SUBCUTANEOUS ONCE
Refills: 0 | Status: COMPLETED | OUTPATIENT
Start: 2023-08-16 | End: 2023-08-16

## 2023-08-16 RX ORDER — CHOLECALCIFEROL (VITAMIN D3) 125 MCG
1 CAPSULE ORAL
Qty: 0 | Refills: 0 | DISCHARGE

## 2023-08-16 RX ORDER — AMOXICILLIN 250 MG/5ML
1 SUSPENSION, RECONSTITUTED, ORAL (ML) ORAL
Qty: 14 | Refills: 0
Start: 2023-08-16 | End: 2023-08-22

## 2023-08-16 RX ORDER — ALBUTEROL 90 UG/1
2 AEROSOL, METERED ORAL
Qty: 0 | Refills: 0 | DISCHARGE

## 2023-08-16 RX ORDER — LEVOTHYROXINE SODIUM 125 MCG
1 TABLET ORAL
Qty: 0 | Refills: 0 | DISCHARGE

## 2023-08-16 RX ADMIN — SODIUM CHLORIDE 1000 MILLILITER(S): 9 INJECTION INTRAMUSCULAR; INTRAVENOUS; SUBCUTANEOUS at 09:07

## 2023-08-16 NOTE — ED ADULT NURSE NOTE - NSFALLUNIVINTERV_ED_ALL_ED
Bed/Stretcher in lowest position, wheels locked, appropriate side rails in place/Call bell, personal items and telephone in reach/Instruct patient to call for assistance before getting out of bed/chair/stretcher/Non-slip footwear applied when patient is off stretcher/[place] to call system/Physically safe environment - no spills, clutter or unnecessary equipment/Purposeful proactive rounding/Room/bathroom lighting operational, light cord in reach

## 2023-08-16 NOTE — ED ADULT NURSE NOTE - CHIEF COMPLAINT QUOTE
c/o chest pain when coughing, congestion, intermittent L arm pain, dizziness, "hot head." denies sick contact, lightheadedness, n/v. pmhx partial thyroidectomy.

## 2023-08-16 NOTE — ED PROVIDER NOTE - NSICDXPASTMEDICALHX_GEN_ALL_CORE_FT
PAST MEDICAL HISTORY:  Asthma     Cervical herniated disc     Hypothyroid     Left inguinal hernia     Malignant neoplasm of thyroid gland     Prediabetes "borderline", per pt

## 2023-08-16 NOTE — ED PROVIDER NOTE - CARE PROVIDER_API CALL
Annmarie Rodrigues)  Internal Medicine  [address] [place] [address]  Phone: [phone]  Fax: [phone]  Established Patient  Follow Up Time: 1-3 Days

## 2023-08-16 NOTE — ED PROVIDER NOTE - OBJECTIVE STATEMENT
59 yo M history of thyroid CA and asthma here complaining of chest pain. has cough for a few days with associated sneezing, when he coughs he has left chest pain and neck and arm pain. Saw PMD yesterday put on clarythromycin, started today. he feels like he has a fever. also feels tired and dizzy. also with frontal headache. patient took Tylenol at home, helped some. patient with recent travel to [place]. 59 yo M history of thyroid CA and asthma here complaining of chest pain. has cough for a few days with associated sneezing, when he coughs he has left chest pain and neck and arm pain. Saw PMD yesterday put on antibiotic- given 5 pills, which he started today. he feels like he has a fever. also feels tired and dizzy. also with frontal headache. patient took Tylenol at home, helped some. patient with recent travel to [place].

## 2023-08-16 NOTE — ED PROVIDER NOTE - NSICDXPASTSURGICALHX_GEN_ALL_CORE_FT
PAST SURGICAL HISTORY:  History of hand surgery left pinky, ~20 years    History of tonsillectomy

## 2023-08-16 NOTE — ED PROVIDER NOTE - PHYSICAL EXAMINATION
Vital signs as available reviewed.  General:  No acute distress.  Head:  Normocephalic, atraumatic.  Eyes:  Conjunctiva pink, no icterus.  Cardiovascular:  Regular rate, slight murmur LSB.  Respiratory:  Clear to auscultation, good air entry bilaterally.  Abdomen:  Soft, non-tender.  Musculoskeletal:  No obvious deformity.  Neurologic: Alert and oriented, moving all extremities.  Skin:  Warm and dry.

## 2023-08-16 NOTE — ED ADULT NURSE NOTE - OBJECTIVE STATEMENT
Patient states that he has been having a productive cough, SOB, fever and CP for the past 3 days. Pt appears uncomfortable. Pt in no acute distress. Pt updated on plan of care.

## 2023-08-16 NOTE — ED PROVIDER NOTE - CLINICAL SUMMARY MEDICAL DECISION MAKING FREE TEXT BOX
here with several days of cough, subjective fever, left sided chest pain with cough. check labs, XR, consider CT, viral swab, re-eval.

## 2023-08-16 NOTE — ED PROVIDER NOTE - NSFOLLOWUPINSTRUCTIONS_ED_ALL_ED_FT
Please follow up with your Primary Care Physician and any specialists as discussed.  Please take your medications as prescribed and or instructed.  Please continue to take your currently prescribed antibiotics.    You can take acetaminophen (Tylenol) for your pain. It is available over the counter. You can take up to 1 gram (three 325mg tablets or two 500mg tablets) every 4-6 hours as needed.    You can also take an NSAID (non-steroidal anti-inflammatory drug) such as ibuprofen (Motrin, Advil), or naproxen (Aleve) for your pain. These are available over the counter. You can take 3 tablets of ibuprofen (200mg each) every 6 hours or 2 tablets of naproxen (220mg each) every 12 hours. Do no mix NSAIDs such as ibuprofen, diclofenac, ketorolac, and naproxen. Please take as needed and please take with food.    You can alternate acetaminophen and a NSAID to help further with pain.     If your symptoms persist or worsen, please seek care. Either return to the Emergency Department, go to urgent care or see your primary care doctor.  Please refer to general information and instructions attached or below:     Pneumonia    WHAT YOU NEED TO KNOW:    Pneumonia is an infection in your lungs caused by bacteria, viruses, fungi, or parasites. You can become infected if you come in contact with someone who is sick. You can get pneumonia if you recently had surgery or needed a ventilator to help you breathe. Pneumonia can also be caused by accidentally inhaling saliva or small pieces of food. Pneumonia may cause mild symptoms, or it can be severe and life-threatening. The Lungs         DISCHARGE INSTRUCTIONS:    Return to the emergency department if:     You cough up blood.       Your heart beats more than 100 beats in 1 minute.       You are very tired, confused, and cannot think clearly.      You have chest pain or trouble breathing.       Your lips or fingernails turn gray or blue.     Contact your healthcare provider if:     Your symptoms are the same or get worse 48 hours after you start antibiotics.      Your fever is not below 99°F (37.2°C) 48 hours after you start antibiotics.       You have a fever higher than 101°F (38.3°C).       You cannot eat, or you have loss of appetite, nausea, or are vomiting.      You have questions or concerns about your condition or care.    Medicines:     Antibiotics treat pneumonia caused by bacteria.      Acetaminophen decreases pain and fever. It is available without a doctor's order. Ask how much to take and how often to take it. Follow directions. Read the labels of all other medicines you are using to see if they also contain acetaminophen, or ask your doctor or pharmacist. Acetaminophen can cause liver damage if not taken correctly. Do not use more than 4 grams (4,000 milligrams) total of acetaminophen in one day.       NSAIDs, such as ibuprofen, help decrease swelling, pain, and fever. This medicine is available with or without a doctor's order. NSAIDs can cause stomach bleeding or kidney problems in certain people. If you take blood thinner medicine, always ask your healthcare provider if NSAIDs are safe for you. Always read the medicine label and follow directions.      Take your medicine as directed. Contact your healthcare provider if you think your medicine is not helping or if you have side effects. Tell him or her if you are allergic to any medicine. Keep a list of the medicines, vitamins, and herbs you take. Include the amounts, and when and why you take them. Bring the list or the pill bottles to follow-up visits. Carry your medicine list with you in case of an emergency.    Follow up with your healthcare provider as directed: You will need to return for more tests. Write down your questions so you remember to ask them during your visits.     Manage your symptoms:     Rest as needed. Rest often throughout the day. Alternate times of activity with times of rest.      Drink liquids as directed. Ask how much liquid to drink each day and which liquids are best for you. Liquids help thin your mucus, which may make it easier for you to cough it up.       Do not smoke. Avoid secondhand smoke. Smoking increases your risk for pneumonia. Smoking also makes it harder for you to get better after you have had pneumonia. Ask your healthcare provider for information if you need help to quit smoking.       Use a cool mist humidifier. A humidifier will help increase air moisture in your home. This may make it easier for you to breathe and help decrease your cough.       Keep your head elevated. You may be able to breathe better if you lie down with the head of your bed up.     Prevent pneumonia:     Prevent the spread of germs. Wash your hands often with soap and water. Use gel hand cleanser when there is no soap and water available. Do not touch your eyes, nose, or mouth unless you have washed your hands first. Cover your mouth when you cough. Cough into a tissue or your shirtsleeve so you do not spread germs from your hands. If you are sick, stay away from others as much as possible. Handwashing           Limit alcohol. Women should limit alcohol to 1 drink a day. Men should limit alcohol to 2 drinks a day. A drink of alcohol is 12 ounces of beer, 5 ounces of wine, or 1½ ounces of liquor.      Ask about vaccines. You may need a vaccine to help prevent pneumonia. Get an influenza (flu) vaccine every year as soon as it becomes available.

## 2023-08-16 NOTE — ED PROVIDER NOTE - PATIENT PORTAL LINK FT
You can access the FollowMyHealth Patient Portal offered by [facility] by registering at the following website: [email or web address]. By joining [employer]’s FollowMyHealth portal, you will also be able to view your health information using other applications (apps) compatible with our system.

## 2023-09-26 ENCOUNTER — APPOINTMENT (OUTPATIENT)
Dept: SURGERY | Facility: CLINIC | Age: 58
End: 2023-09-26
Payer: COMMERCIAL

## 2023-09-26 PROCEDURE — 99214 OFFICE O/P EST MOD 30 MIN: CPT

## 2023-10-23 DIAGNOSIS — C73 MALIGNANT NEOPLASM OF THYROID GLAND: ICD-10-CM

## 2024-01-01 NOTE — ED ADULT NURSE NOTE - ED CARDIAC RATE
plan of care explained/repositioned/side rails up
darkened lights/plan of care explained/po fluids offered/repositioned/side rails up
plan of care explained/side rails up
darkened lights/meal provided/repositioned/side rails up
darkened lights/plan of care explained/po fluids offered/repositioned/side rails up
normal
darkened lights/plan of care explained/side rails up

## 2024-01-10 ENCOUNTER — OFFICE (OUTPATIENT)
Dept: URBAN - METROPOLITAN AREA CLINIC 35 | Facility: CLINIC | Age: 59
Setting detail: OPHTHALMOLOGY
End: 2024-01-10
Payer: COMMERCIAL

## 2024-01-10 DIAGNOSIS — E11.9: ICD-10-CM

## 2024-01-10 DIAGNOSIS — H25.013: ICD-10-CM

## 2024-01-10 DIAGNOSIS — D31.32: ICD-10-CM

## 2024-01-10 DIAGNOSIS — H43.812: ICD-10-CM

## 2024-01-10 DIAGNOSIS — H43.392: ICD-10-CM

## 2024-01-10 DIAGNOSIS — H25.13: ICD-10-CM

## 2024-01-10 PROCEDURE — 99214 OFFICE O/P EST MOD 30 MIN: CPT | Performed by: OPHTHALMOLOGY

## 2024-01-10 ASSESSMENT — REFRACTION_AUTOREFRACTION
OD_AXIS: 18
OS_SPHERE: +0.25
OD_CYLINDER: -0.50
OS_CYLINDER: -1.00
OD_SPHERE: +0.25
OS_AXIS: 147

## 2024-01-10 ASSESSMENT — SPHEQUIV_DERIVED
OD_SPHEQUIV: 0
OS_SPHEQUIV: -0.25

## 2024-01-10 ASSESSMENT — CONFRONTATIONAL VISUAL FIELD TEST (CVF)
OD_FINDINGS: FULL
OS_FINDINGS: FULL

## 2024-01-10 ASSESSMENT — SUPERFICIAL PUNCTATE KERATITIS (SPK)
OD_SPK: 2+
OS_SPK: 2+

## 2024-01-10 ASSESSMENT — LID EXAM ASSESSMENTS
OD_BLEPHARITIS: 1+
OS_BLEPHARITIS: 1+

## 2024-01-22 ENCOUNTER — OFFICE (OUTPATIENT)
Dept: URBAN - METROPOLITAN AREA CLINIC 32 | Facility: CLINIC | Age: 59
Setting detail: OPHTHALMOLOGY
End: 2024-01-22
Payer: COMMERCIAL

## 2024-01-22 DIAGNOSIS — H35.371: ICD-10-CM

## 2024-01-22 DIAGNOSIS — H43.392: ICD-10-CM

## 2024-01-22 DIAGNOSIS — D31.32: ICD-10-CM

## 2024-01-22 DIAGNOSIS — H43.812: ICD-10-CM

## 2024-01-22 DIAGNOSIS — E11.9: ICD-10-CM

## 2024-01-22 PROBLEM — H25.013 CORTICAL AGE RELATED CATARACT; BOTH EYES: Status: ACTIVE | Noted: 2024-01-10

## 2024-01-22 PROBLEM — H57.12 PAIN IN/OR AROUND EYES; LEFT EYE: Status: ACTIVE | Noted: 2024-01-10

## 2024-01-22 PROCEDURE — 92201 OPSCPY EXTND RTA DRAW UNI/BI: CPT | Performed by: OPHTHALMOLOGY

## 2024-01-22 PROCEDURE — 99213 OFFICE O/P EST LOW 20 MIN: CPT | Performed by: OPHTHALMOLOGY

## 2024-01-22 PROCEDURE — 92134 CPTRZ OPH DX IMG PST SGM RTA: CPT | Performed by: OPHTHALMOLOGY

## 2024-01-22 ASSESSMENT — REFRACTION_AUTOREFRACTION
OS_CYLINDER: -1.00
OS_SPHERE: +0.25
OS_AXIS: 147
OD_AXIS: 18
OD_SPHERE: +0.25
OD_CYLINDER: -0.50

## 2024-01-22 ASSESSMENT — SPHEQUIV_DERIVED
OD_SPHEQUIV: 0
OS_SPHEQUIV: -0.25

## 2024-01-22 ASSESSMENT — SUPERFICIAL PUNCTATE KERATITIS (SPK)
OS_SPK: 2+
OD_SPK: 2+

## 2024-01-22 ASSESSMENT — LID EXAM ASSESSMENTS
OD_BLEPHARITIS: 1+
OS_BLEPHARITIS: 1+

## 2024-01-22 ASSESSMENT — CONFRONTATIONAL VISUAL FIELD TEST (CVF)
OS_FINDINGS: FULL
OD_FINDINGS: FULL

## 2024-05-02 ENCOUNTER — APPOINTMENT (OUTPATIENT)
Dept: SURGERY | Facility: CLINIC | Age: 59
End: 2024-05-02

## 2024-05-24 NOTE — H&P PST ADULT - VENOUS THROMBOEMBOLISM FOR WOMEN ONLY
PAST SURGICAL HISTORY:  Breast Cyst benign cyst removal, unsure either left or right as per patient    
(0) indicator not present

## 2024-09-19 NOTE — H&P PST ADULT - VENOUS THROMBOEMBOLISM BMI
You were seen in the emergency department today for nausea.  Sodium level went down slightly 127.  Follow-up with your primary care physician and nephrology as needed for reevaluation.  Take the pantoprazole daily.  Compazine as needed for nausea  
(0) indicator not present

## 2024-10-30 ENCOUNTER — APPOINTMENT (OUTPATIENT)
Dept: SURGERY | Facility: CLINIC | Age: 59
End: 2024-10-30

## 2024-10-30 VITALS
HEIGHT: 65 IN | SYSTOLIC BLOOD PRESSURE: 122 MMHG | WEIGHT: 145.5 LBS | TEMPERATURE: 98 F | HEART RATE: 76 BPM | BODY MASS INDEX: 24.24 KG/M2 | OXYGEN SATURATION: 97 % | DIASTOLIC BLOOD PRESSURE: 78 MMHG

## 2024-10-30 DIAGNOSIS — Z87.09 PERSONAL HISTORY OF OTHER DISEASES OF THE RESPIRATORY SYSTEM: ICD-10-CM

## 2024-10-30 DIAGNOSIS — Z78.9 OTHER SPECIFIED HEALTH STATUS: ICD-10-CM

## 2024-10-30 PROCEDURE — 99215 OFFICE O/P EST HI 40 MIN: CPT

## 2024-12-04 ENCOUNTER — APPOINTMENT (OUTPATIENT)
Dept: SURGERY | Facility: CLINIC | Age: 59
End: 2024-12-04
Payer: COMMERCIAL

## 2024-12-04 ENCOUNTER — NON-APPOINTMENT (OUTPATIENT)
Age: 59
End: 2024-12-04

## 2024-12-04 VITALS
HEART RATE: 63 BPM | BODY MASS INDEX: 24.16 KG/M2 | SYSTOLIC BLOOD PRESSURE: 135 MMHG | HEIGHT: 65 IN | WEIGHT: 145 LBS | DIASTOLIC BLOOD PRESSURE: 87 MMHG | TEMPERATURE: 98.2 F | OXYGEN SATURATION: 99 %

## 2024-12-04 DIAGNOSIS — K40.20 BILATERAL INGUINAL HERNIA, W/OUT OBSTRUCTION OR GANGRENE, NOT SPECIFIED AS RECURRENT: ICD-10-CM

## 2024-12-04 DIAGNOSIS — L72.3 SEBACEOUS CYST: ICD-10-CM

## 2024-12-04 DIAGNOSIS — D17.9 BENIGN LIPOMATOUS NEOPLASM, UNSPECIFIED: ICD-10-CM

## 2024-12-04 PROCEDURE — 99213 OFFICE O/P EST LOW 20 MIN: CPT

## 2025-01-02 DIAGNOSIS — Z78.9 OTHER SPECIFIED HEALTH STATUS: ICD-10-CM

## 2025-01-02 DIAGNOSIS — Z87.09 PERSONAL HISTORY OF OTHER DISEASES OF THE RESPIRATORY SYSTEM: ICD-10-CM

## 2025-01-02 PROBLEM — D17.1 LIPOMA OF ABDOMINAL WALL: Status: ACTIVE | Noted: 2025-01-02

## 2025-04-25 NOTE — ED ADULT NURSE NOTE - CINV DISCH MEDS REVIEWED YN
Please see your primary care provider as soon as possible for follow up care.    Please review the below general information, including “when to seek medical advice” precautions.    Please return here (or an emergency department, depending on severity) for any worsening of your cough, new difficulty breathing, chest pain, fever, or with any other acute medical concerns.    As we discussed, you may have a couple of occasional skipped heartbeats.  It would be a good idea to call your primary care office and see if they can move up your next routine appointment to within the next couple weeks (instead of waiting until August, to get this rechecked.  You could also get your blood pressure rechecked at that time in their clinic as well.    Regarding your elevated blood pressure in clinic today, please see your primary care provider as soon as possible for follow up care.  Before then, consider periodically checking your blood pressure at home, writing down the results, and bringing that information to your doctor's office during follow up.  Please return here or go to the emergency department if you think you have any new symptoms associated with your elevated blood pressure.    
simethicone/Yes

## 2025-06-12 ENCOUNTER — APPOINTMENT (OUTPATIENT)
Dept: SURGERY | Facility: CLINIC | Age: 60
End: 2025-06-12
Payer: COMMERCIAL

## 2025-06-12 ENCOUNTER — LABORATORY RESULT (OUTPATIENT)
Age: 60
End: 2025-06-12

## 2025-06-12 VITALS — HEIGHT: 65 IN | BODY MASS INDEX: 24.99 KG/M2 | WEIGHT: 150 LBS

## 2025-06-12 PROCEDURE — 36415 COLL VENOUS BLD VENIPUNCTURE: CPT

## 2025-06-12 PROCEDURE — 99214 OFFICE O/P EST MOD 30 MIN: CPT

## 2025-06-13 ENCOUNTER — NON-APPOINTMENT (OUTPATIENT)
Age: 60
End: 2025-06-13

## 2025-06-13 LAB
T3 SERPL-MCNC: 94 NG/DL
T4 FREE SERPL-MCNC: 1.4 NG/DL
THYROGLOB AB SERPL-ACNC: 15.7 IU/ML
THYROGLOB SERPL-MCNC: 3.18 NG/ML
TSH SERPL-ACNC: 2.59 UIU/ML